# Patient Record
Sex: MALE | Race: WHITE | Employment: FULL TIME | URBAN - METROPOLITAN AREA
[De-identification: names, ages, dates, MRNs, and addresses within clinical notes are randomized per-mention and may not be internally consistent; named-entity substitution may affect disease eponyms.]

---

## 2021-11-28 ENCOUNTER — APPOINTMENT (OUTPATIENT)
Dept: GENERAL RADIOLOGY | Age: 51
DRG: 177 | End: 2021-11-28
Attending: EMERGENCY MEDICINE
Payer: COMMERCIAL

## 2021-11-28 ENCOUNTER — HOSPITAL ENCOUNTER (INPATIENT)
Age: 51
LOS: 8 days | Discharge: HOME HEALTH | DRG: 177 | End: 2021-12-06
Attending: EMERGENCY MEDICINE | Admitting: INTERNAL MEDICINE
Payer: COMMERCIAL

## 2021-11-28 DIAGNOSIS — U07.1 COVID-19: Primary | ICD-10-CM

## 2021-11-28 DIAGNOSIS — R09.02 HYPOXIA: ICD-10-CM

## 2021-11-28 PROBLEM — J12.82 PNEUMONIA DUE TO COVID-19 VIRUS: Status: ACTIVE | Noted: 2021-11-28

## 2021-11-28 PROBLEM — J96.01 ACUTE RESPIRATORY FAILURE WITH HYPOXIA (HCC): Status: ACTIVE | Noted: 2021-11-28

## 2021-11-28 LAB
ALBUMIN SERPL-MCNC: 2.4 G/DL (ref 3.5–5)
ALBUMIN/GLOB SERPL: 0.4 {RATIO} (ref 1.1–2.2)
ALP SERPL-CCNC: 66 U/L (ref 45–117)
ALT SERPL-CCNC: 70 U/L (ref 12–78)
ANION GAP SERPL CALC-SCNC: 16 MMOL/L (ref 5–15)
AST SERPL-CCNC: 55 U/L (ref 15–37)
BASOPHILS # BLD: 0 K/UL (ref 0–0.1)
BASOPHILS NFR BLD: 0 % (ref 0–1)
BILIRUB SERPL-MCNC: 0.8 MG/DL (ref 0.2–1)
BUN SERPL-MCNC: 21 MG/DL (ref 6–20)
BUN/CREAT SERPL: 18 (ref 12–20)
CALCIUM SERPL-MCNC: 8.9 MG/DL (ref 8.5–10.1)
CHLORIDE SERPL-SCNC: 92 MMOL/L (ref 97–108)
CO2 SERPL-SCNC: 20 MMOL/L (ref 21–32)
COVID-19 RAPID TEST, COVR: DETECTED
CREAT SERPL-MCNC: 1.15 MG/DL (ref 0.7–1.3)
D DIMER PPP FEU-MCNC: 1.45 MG/L FEU (ref 0–0.65)
DIFFERENTIAL METHOD BLD: ABNORMAL
EOSINOPHIL # BLD: 0 K/UL (ref 0–0.4)
EOSINOPHIL NFR BLD: 0 % (ref 0–7)
ERYTHROCYTE [DISTWIDTH] IN BLOOD BY AUTOMATED COUNT: 11.3 % (ref 11.5–14.5)
EST. AVERAGE GLUCOSE BLD GHB EST-MCNC: 289 MG/DL
FERRITIN SERPL-MCNC: 1610 NG/ML (ref 26–388)
FLUAV AG NPH QL IA: NEGATIVE
FLUBV AG NOSE QL IA: NEGATIVE
GLOBULIN SER CALC-MCNC: 6 G/DL (ref 2–4)
GLUCOSE SERPL-MCNC: 423 MG/DL (ref 65–100)
HBA1C MFR BLD: 11.7 % (ref 4–5.6)
HCT VFR BLD AUTO: 36.3 % (ref 36.6–50.3)
HGB BLD-MCNC: 12.8 G/DL (ref 12.1–17)
IMM GRANULOCYTES # BLD AUTO: 0.1 K/UL (ref 0–0.04)
IMM GRANULOCYTES NFR BLD AUTO: 1 % (ref 0–0.5)
LYMPHOCYTES # BLD: 0.4 K/UL (ref 0.8–3.5)
LYMPHOCYTES NFR BLD: 5 % (ref 12–49)
MCH RBC QN AUTO: 31.7 PG (ref 26–34)
MCHC RBC AUTO-ENTMCNC: 35.3 G/DL (ref 30–36.5)
MCV RBC AUTO: 89.9 FL (ref 80–99)
MONOCYTES # BLD: 0.2 K/UL (ref 0–1)
MONOCYTES NFR BLD: 2 % (ref 5–13)
NEUTS SEG # BLD: 7.2 K/UL (ref 1.8–8)
NEUTS SEG NFR BLD: 92 % (ref 32–75)
NRBC # BLD: 0 K/UL (ref 0–0.01)
NRBC BLD-RTO: 0 PER 100 WBC
PLATELET # BLD AUTO: 396 K/UL (ref 150–400)
PMV BLD AUTO: 10.2 FL (ref 8.9–12.9)
POTASSIUM SERPL-SCNC: 4 MMOL/L (ref 3.5–5.1)
PROCALCITONIN SERPL-MCNC: 0.46 NG/ML
PROT SERPL-MCNC: 8.4 G/DL (ref 6.4–8.2)
RBC # BLD AUTO: 4.04 M/UL (ref 4.1–5.7)
RBC MORPH BLD: ABNORMAL
SODIUM SERPL-SCNC: 128 MMOL/L (ref 136–145)
SOURCE, COVRS: ABNORMAL
TROPONIN-HIGH SENSITIVITY: 24 NG/L (ref 0–76)
WBC # BLD AUTO: 7.9 K/UL (ref 4.1–11.1)

## 2021-11-28 PROCEDURE — 74011250637 HC RX REV CODE- 250/637: Performed by: INTERNAL MEDICINE

## 2021-11-28 PROCEDURE — 71045 X-RAY EXAM CHEST 1 VIEW: CPT

## 2021-11-28 PROCEDURE — 87635 SARS-COV-2 COVID-19 AMP PRB: CPT

## 2021-11-28 PROCEDURE — 82728 ASSAY OF FERRITIN: CPT

## 2021-11-28 PROCEDURE — 80053 COMPREHEN METABOLIC PANEL: CPT

## 2021-11-28 PROCEDURE — 36415 COLL VENOUS BLD VENIPUNCTURE: CPT

## 2021-11-28 PROCEDURE — 87804 INFLUENZA ASSAY W/OPTIC: CPT

## 2021-11-28 PROCEDURE — XW0DXM6 INTRODUCTION OF BARICITINIB INTO MOUTH AND PHARYNX, EXTERNAL APPROACH, NEW TECHNOLOGY GROUP 6: ICD-10-PCS | Performed by: INTERNAL MEDICINE

## 2021-11-28 PROCEDURE — 85379 FIBRIN DEGRADATION QUANT: CPT

## 2021-11-28 PROCEDURE — 83036 HEMOGLOBIN GLYCOSYLATED A1C: CPT

## 2021-11-28 PROCEDURE — 93005 ELECTROCARDIOGRAM TRACING: CPT

## 2021-11-28 PROCEDURE — 74011250636 HC RX REV CODE- 250/636: Performed by: EMERGENCY MEDICINE

## 2021-11-28 PROCEDURE — 85025 COMPLETE CBC W/AUTO DIFF WBC: CPT

## 2021-11-28 PROCEDURE — 65660000000 HC RM CCU STEPDOWN

## 2021-11-28 PROCEDURE — 99285 EMERGENCY DEPT VISIT HI MDM: CPT

## 2021-11-28 PROCEDURE — 84484 ASSAY OF TROPONIN QUANT: CPT

## 2021-11-28 PROCEDURE — 84145 PROCALCITONIN (PCT): CPT

## 2021-11-28 RX ORDER — ACETAMINOPHEN 325 MG/1
650 TABLET ORAL
Status: DISCONTINUED | OUTPATIENT
Start: 2021-11-28 | End: 2021-12-06 | Stop reason: HOSPADM

## 2021-11-28 RX ORDER — ONDANSETRON 2 MG/ML
4 INJECTION INTRAMUSCULAR; INTRAVENOUS
Status: DISCONTINUED | OUTPATIENT
Start: 2021-11-28 | End: 2021-12-06 | Stop reason: HOSPADM

## 2021-11-28 RX ORDER — DEXAMETHASONE 6 MG/1
6 TABLET ORAL DAILY
Status: DISCONTINUED | OUTPATIENT
Start: 2021-11-29 | End: 2021-12-02

## 2021-11-28 RX ORDER — SODIUM CHLORIDE 0.9 % (FLUSH) 0.9 %
5-40 SYRINGE (ML) INJECTION EVERY 8 HOURS
Status: DISCONTINUED | OUTPATIENT
Start: 2021-11-28 | End: 2021-12-06 | Stop reason: HOSPADM

## 2021-11-28 RX ORDER — DEXAMETHASONE SODIUM PHOSPHATE 4 MG/ML
6 INJECTION, SOLUTION INTRA-ARTICULAR; INTRALESIONAL; INTRAMUSCULAR; INTRAVENOUS; SOFT TISSUE ONCE
Status: COMPLETED | OUTPATIENT
Start: 2021-11-28 | End: 2021-11-28

## 2021-11-28 RX ORDER — ACETAMINOPHEN 650 MG/1
650 SUPPOSITORY RECTAL
Status: DISCONTINUED | OUTPATIENT
Start: 2021-11-28 | End: 2021-12-06 | Stop reason: HOSPADM

## 2021-11-28 RX ORDER — POLYETHYLENE GLYCOL 3350 17 G/17G
17 POWDER, FOR SOLUTION ORAL DAILY PRN
Status: DISCONTINUED | OUTPATIENT
Start: 2021-11-28 | End: 2021-12-06 | Stop reason: HOSPADM

## 2021-11-28 RX ORDER — SODIUM CHLORIDE 0.9 % (FLUSH) 0.9 %
5-40 SYRINGE (ML) INJECTION AS NEEDED
Status: DISCONTINUED | OUTPATIENT
Start: 2021-11-28 | End: 2021-12-06 | Stop reason: HOSPADM

## 2021-11-28 RX ORDER — ENOXAPARIN SODIUM 100 MG/ML
40 INJECTION SUBCUTANEOUS DAILY
Status: DISCONTINUED | OUTPATIENT
Start: 2021-11-29 | End: 2021-12-06 | Stop reason: HOSPADM

## 2021-11-28 RX ORDER — ONDANSETRON 4 MG/1
4 TABLET, ORALLY DISINTEGRATING ORAL
Status: DISCONTINUED | OUTPATIENT
Start: 2021-11-28 | End: 2021-12-06 | Stop reason: HOSPADM

## 2021-11-28 RX ADMIN — Medication 10 ML: at 15:00

## 2021-11-28 RX ADMIN — BARICITINIB 4 MG: 2 TABLET, FILM COATED ORAL at 14:28

## 2021-11-28 RX ADMIN — DEXAMETHASONE SODIUM PHOSPHATE 6 MG: 4 INJECTION, SOLUTION INTRAMUSCULAR; INTRAVENOUS at 14:28

## 2021-11-28 NOTE — ED TRIAGE NOTES
Patient arrives with c/o of SOB, fatigue, N/V/D, cough, body aches. Sx present for past 7 days. Vaccinated with pfizer vaccine, both doses. SpO2: 88% on RA. Placed on 2 L NC in triage. SpO2: 92% on 2L.

## 2021-11-28 NOTE — H&P
[facility]  [address] [facility], [place], [address] [place] [address]  [phone]    Admission History and Physical      NAME:  Melissa Bennett   :   1970   MRN:  [id number]     PCP:  No primary care provider on file. Date/Time of service:  2021  1:43 PM        Subjective:     CHIEF COMPLAINT: worsening shortness of breath    HISTORY OF PRESENT ILLNESS:     Mr. Diana Masterson is a 49 y.o.  male who is admitted with covid 23. He developed symptoms last Monday or Tuesday which included shortness of breath, fatigue, nausea, vomiting, diarrhea. States by Tuesday night, diarrhea had been the worse but currently diarrhea is resolved. Primary symptom now is worsening shortness of breath. Has been vaccinated with pfizer vaccine, second dose end of April. States he is visiting his brother here in [place]. Of note, he has list of meds on his phone and says he will \"do his homework\" and let us know what they are but that he would like to just focus on this covid and not worry about anything else. I explain that we are focusing on covid but that we also will continue his home regimen to help him. No Known Allergies    Prior to Admission medications    Not on File   Awaiting patient to provide list from his phone    Past Medical History: CAD, DM2    No past surgical history on file.     Social History     Tobacco Use    Smoking status: Not on file    Smokeless tobacco: Not on file   Substance Use Topics    Alcohol use: Not on file      Family History: noncontributory    Review of Systems:  (bold if positive, if negative)    Gen:  ; body achesEyes:  ENT:  CVS:  Pulm:  dyspneaGI:  :  MS:  Skin:  Psych:  Endo:  Hem:  Renal:  Neuro:          Objective:      VITALS:    Vital signs reviewed; most recent are:    Visit Vitals  BP (!) 149/63   Pulse 85   Temp 98.2 °F (36.8 °C)   Resp 24   Ht 6' 3\" (1.905 m)   Wt 118.1 kg (260 lb 5.8 oz)   SpO2 96%   BMI 32.54 kg/m²     SpO2 Readings from Last 6 Encounters: 11/28/21 96%    O2 Flow Rate (L/min): 2 l/min   No intake or output data in the 24 hours ending 11/28/21 1343     Exam:     Physical Exam:    Gen:  Well-developed, well-nourished, in no acute distress  HEENT:  Pink conjunctivae, PERRL, hearing intact to voice, moist mucous membranes  Neck:  Supple, without masses, thyroid non-tender  Resp:  No accessory muscle use, clear breath sounds without wheezes rales or rhonchi  Card:  No murmurs, normal S1, S2 without thrills, bruits or peripheral edema  Abd:  Soft, non-tender, non-distended, normoactive bowel sounds are present, no palpable organomegaly and no detectable hernias  Lymph:  No cervical adenopathy  Musc:  No cyanosis or clubbing  Skin:  No rashes or ulcers, skin turgor is good  Neuro:  Nonfocal, moves extremities, follows commands  Psych:  Alert with good insight. Oriented to person, place, and time    Labs:    Recent Labs     11/28/21  1210   WBC 7.9   HGB 12.8   HCT 36.3*        Recent Labs     11/28/21  1210   *   K 4.0   CL 92*   CO2 20*   *   BUN 21*   CREA 1.15   CA 8.9   ALB 2.4*   TBILI 0.8   ALT 70          Assessment/Plan:       1. Acute hypoxic respiratory failure - supp O2  2. Covid 19 pneumonia, infection - decadron, baricitinib; trend inflammatory markers  3. DM2 with hyperglycemia - glu 423 on admission; pseudohyponatremia, check a1c; on metformin, glipizide as outpatient, reorder once doses confirmed  4. CAD s/p stents - on lipitor, brilinta, asa as outpatient, reorder once doses confirmed  5. Elevated creatinine - no baseline for comparison; poss LISA sec to dec po intake, dehydration from covid; IVF, monitor  6. Obesity - BMI 32.54  7. VTE prophylaxis - lovenox  8. Dispo - admit to remote tele    Spent 20 minutes discussing code status. He wishes to be DNR. I confirmed multiple times and patient states \"just let me go. I've lived my life. \" Order placed in [facility].     Attending Physician: Brenda Casey,

## 2021-11-28 NOTE — ED PROVIDER NOTES
Date of Service:  11/28/2021    Patient:  Enmanuel Galvan    Chief Complaint:  Concern For COVID-19 (Coronavirus) and Shortness of Breath       HPI:  Enmanuel Galvan is a 49 y.o.  male who presents for evaluation of concern for coronavirus. Patient started having symptoms Sunday or Monday a week ago. The next day he drove to town to visit family for Thanksgiving. He notes several days of fever 10 1-1 02. Multiple episodes of diarrhea one evening body ache shortness of breath nonproductive cough. Currently he says diarrhea has resolved. His only complaint now is shortness of breath, feeling very fatigued, intermittent cough and body aches. He is vaccinated against Covid. He denies any chest pain abdominal pain. No current nausea or vomiting but he does have decreased appetite. No other complaint           No past medical history on file. No past surgical history on file. No family history on file. Social History     Socioeconomic History    Marital status: Not on file     Spouse name: Not on file    Number of children: Not on file    Years of education: Not on file    Highest education level: Not on file   Occupational History    Not on file   Tobacco Use    Smoking status: Not on file    Smokeless tobacco: Not on file   Substance and Sexual Activity    Alcohol use: Not on file    Drug use: Not on file    Sexual activity: Not on file   Other Topics Concern    Not on file   Social History Narrative    Not on file     Social Determinants of Health     Financial Resource Strain:     Difficulty of Paying Living Expenses: Not on file   Food Insecurity:     Worried About Running Out of Food in the Last Year: Not on file    Inez of Food in the Last Year: Not on file   Transportation Needs:     Lack of Transportation (Medical): Not on file    Lack of Transportation (Non-Medical):  Not on file   Physical Activity:     Days of Exercise per Week: Not on file    Minutes of Exercise per Session: Not on file   Stress:     Feeling of Stress : Not on file   Social Connections:     Frequency of Communication with Friends and Family: Not on file    Frequency of Social Gatherings with Friends and Family: Not on file    Attends [employer] Services: Not on file    Active Member of Clubs or Organizations: Not on file    Attends Club or Organization Meetings: Not on file    Marital Status: Not on file   Intimate Partner Violence:     Fear of Current or Ex-Partner: Not on file    Emotionally Abused: Not on file    Physically Abused: Not on file    Sexually Abused: Not on file   Housing Stability:     Unable to Pay for Housing in the Last Year: Not on file    Number of [place] in the Last Year: Not on file    Unstable Housing in the Last Year: Not on file         ALLERGIES: Patient has no known allergies. Review of Systems   Constitutional: Positive for appetite change, fatigue and fever. HENT: Negative for hearing loss. Eyes: Negative for visual disturbance. Respiratory: Positive for cough. Cardiovascular: Negative for chest pain. Gastrointestinal: Positive for diarrhea and nausea. Negative for abdominal pain and vomiting. Genitourinary: Negative for flank pain. Musculoskeletal: Positive for myalgias. Negative for back pain. Skin: Negative for rash. Neurological: Negative for dizziness and light-headedness. Psychiatric/Behavioral: Negative for suicidal ideas. Vitals:    11/28/21 1152 11/28/21 1154 11/28/21 1219   BP: (!) 151/55     Pulse: 85     Resp: 24     Temp: 98.2 °F (36.8 °C)     SpO2: (!) 87% 92% 92%   Weight: 118.1 kg (260 lb 5.8 oz)     Height: 6' 3\" (1.905 m)              Physical Exam  Vitals and nursing note reviewed. Constitutional:       Appearance: He is well-developed. HENT:      Head: Normocephalic and atraumatic. Mouth/Throat:      Mouth: Mucous membranes are moist.   Cardiovascular:      Rate and Rhythm: Normal rate.    Pulmonary:      Effort: Pulmonary effort is normal. Tachypnea present. Breath sounds: No wheezing or rhonchi. Chest:      Chest wall: No mass or tenderness. Abdominal:      Palpations: Abdomen is soft. Musculoskeletal:      Right lower leg: No tenderness. No edema. Left lower leg: No tenderness. No edema. Skin:     General: Skin is warm. Capillary Refill: Capillary refill takes less than 2 seconds. Neurological:      Mental Status: He is alert and oriented to person, place, and time. Psychiatric:         Mood and Affect: Mood normal.          [facility]  ED Course as of 11/28/21 1338   Sun Nov 28, 2021   1139 EKG 1126  NSR 83  Normal axis. intervals  No STEMI [GG]   1309 Sodium(!): 128 [GG]   1309 Glucose(!): 423 [GG]      ED Course User Index  [GG] Art Apt, DO     VITAL SIGNS:  Patient Vitals for the past 4 hrs:   Temp Pulse Resp BP SpO2   11/28/21 1300    (!) 149/63 96 %   11/28/21 1230    (!) 146/68 99 %   11/28/21 1219     92 %   11/28/21 1154     92 %   11/28/21 1152 98.2 °F (36.8 °C) 85 24 (!) 151/55 (!) 87 %         LABS:  Recent Results (from the past 6 hour(s))   CBC WITH AUTOMATED DIFF    Collection Time: 11/28/21 12:10 PM   Result Value Ref Range    WBC 7.9 4.1 - 11.1 K/uL    RBC 4.04 (L) 4.10 - 5.70 M/uL    HGB 12.8 12.1 - 17.0 g/dL    HCT 36.3 (L) 36.6 - 50.3 %    MCV 89.9 80.0 - 99.0 FL    MCH 31.7 26.0 - 34.0 PG    MCHC 35.3 30.0 - 36.5 g/dL    RDW 11.3 (L) 11.5 - 14.5 %    PLATELET 823 846 - 107 K/uL    MPV 10.2 8.9 - 12.9 FL    NRBC 0.0 0  WBC    ABSOLUTE NRBC 0.00 0.00 - 0.01 K/uL    NEUTROPHILS 92 (H) 32 - 75 %    LYMPHOCYTES 5 (L) 12 - 49 %    MONOCYTES 2 (L) 5 - 13 %    EOSINOPHILS 0 0 - 7 %    BASOPHILS 0 0 - 1 %    IMMATURE GRANULOCYTES 1 (H) 0.0 - 0.5 %    ABS. NEUTROPHILS 7.2 1.8 - 8.0 K/UL    ABS. LYMPHOCYTES 0.4 (L) 0.8 - 3.5 K/UL    ABS. MONOCYTES 0.2 0.0 - 1.0 K/UL    ABS. EOSINOPHILS 0.0 0.0 - 0.4 K/UL    ABS. BASOPHILS 0.0 0.0 - 0.1 K/UL    ABS. IMM. GRANS. 0.1 (H) 0.00 - 0.04 K/UL    DF SMEAR SCANNED      RBC COMMENTS NORMOCYTIC, NORMOCHROMIC     METABOLIC PANEL, COMPREHENSIVE    Collection Time: 11/28/21 12:10 PM   Result Value Ref Range    Sodium 128 (L) 136 - 145 mmol/L    Potassium 4.0 3.5 - 5.1 mmol/L    Chloride 92 (L) 97 - 108 mmol/L    CO2 20 (L) 21 - 32 mmol/L    Anion gap 16 (H) 5 - 15 mmol/L    Glucose 423 (H) 65 - 100 mg/dL    BUN 21 (H) 6 - 20 MG/DL    Creatinine 1.15 0.70 - 1.30 MG/DL    BUN/Creatinine ratio 18 12 - 20      GFR est AA >60 >60 ml/min/1.73m2    GFR est non-AA >60 >60 ml/min/1.73m2    Calcium 8.9 8.5 - 10.1 MG/DL    Bilirubin, total 0.8 0.2 - 1.0 MG/DL    ALT (SGPT) 70 12 - 78 U/L    AST (SGOT) 55 (H) 15 - 37 U/L    Alk. phosphatase 66 45 - 117 U/L    Protein, total 8.4 (H) 6.4 - 8.2 g/dL    Albumin 2.4 (L) 3.5 - 5.0 g/dL    Globulin 6.0 (H) 2.0 - 4.0 g/dL    A-G Ratio 0.4 (L) 1.1 - 2.2     PROCALCITONIN    Collection Time: 11/28/21 12:10 PM   Result Value Ref Range    Procalcitonin 0.46 ng/mL   TROPONIN-HIGH SENSITIVITY    Collection Time: 11/28/21 12:10 PM   Result Value Ref Range    Troponin-High Sensitivity 24 0 - 76 ng/L   COVID-19 RAPID TEST    Collection Time: 11/28/21 12:45 PM   Result Value Ref Range    Specimen source Nasopharyngeal      COVID-19 rapid test Detected (AA) NOTD     INFLUENZA A+B VIRAL AGS    Collection Time: 11/28/21 12:45 PM   Result Value Ref Range    Influenza A Antigen Negative NEG      Influenza B Antigen Negative NEG          IMAGING:  XR CHEST PORT   Final Result   Faint airspace opacities in the right midlung may represent   early/mild atypical/viral pneumonia. Medications During Visit:  Medications   dexamethasone (DECADRON) 4 mg/mL injection 6 mg (has no administration in time range)         DECISION MAKING:  Tom Serrano is a 49 y.o. male who comes in as above. Here, patient appears more comfortable on supplemental oxygen. He is hypoxic without it.   Patient will be admitted for Covid pneumonia. Decadron provided. Patient is requiring supplemental oxygen. Total critical care time spent exclusive of procedures:  35 minutes      IMPRESSION:  1. COVID-19    2. Hypoxia        DISPOSITION:  Admitted          Procedures      Perfect Serve Consult for Admission  1:38 PM    ED Room Number: ER17/17  Patient Name and age:  Cyril Turner 49 y.o.  male  Working Diagnosis:   1. COVID-19    2. Hypoxia        COVID-19 Suspicion:  yes  Sepsis present:  no  Reassessment needed: no  Code Status:  Full Code  Readmission: no  Isolation Requirements:  no  Recommended Level of Care:  telemetry  Department:[facility] ED - [phone]  Other:  COVID (vaccinated) with hypoxia, resolved with NC o2. Feeling better.

## 2021-11-29 LAB
ALBUMIN SERPL-MCNC: 2.2 G/DL (ref 3.5–5)
ALBUMIN/GLOB SERPL: 0.4 {RATIO} (ref 1.1–2.2)
ALP SERPL-CCNC: 61 U/L (ref 45–117)
ALT SERPL-CCNC: 64 U/L (ref 12–78)
ANION GAP SERPL CALC-SCNC: 16 MMOL/L (ref 5–15)
AST SERPL-CCNC: 36 U/L (ref 15–37)
ATRIAL RATE: 83 BPM
BASOPHILS # BLD: 0 K/UL (ref 0–0.1)
BASOPHILS NFR BLD: 0 % (ref 0–1)
BILIRUB SERPL-MCNC: 0.5 MG/DL (ref 0.2–1)
BUN SERPL-MCNC: 34 MG/DL (ref 6–20)
BUN/CREAT SERPL: 29 (ref 12–20)
CALCIUM SERPL-MCNC: 9.1 MG/DL (ref 8.5–10.1)
CALCULATED P AXIS, ECG09: 46 DEGREES
CALCULATED R AXIS, ECG10: 27 DEGREES
CALCULATED T AXIS, ECG11: -19 DEGREES
CHLORIDE SERPL-SCNC: 96 MMOL/L (ref 97–108)
CO2 SERPL-SCNC: 19 MMOL/L (ref 21–32)
COMMENT, HOLDF: NORMAL
CREAT SERPL-MCNC: 1.18 MG/DL (ref 0.7–1.3)
CRP SERPL-MCNC: 20.5 MG/DL (ref 0–0.6)
DIAGNOSIS, 93000: NORMAL
DIFFERENTIAL METHOD BLD: ABNORMAL
EOSINOPHIL # BLD: 0 K/UL (ref 0–0.4)
EOSINOPHIL NFR BLD: 0 % (ref 0–7)
ERYTHROCYTE [DISTWIDTH] IN BLOOD BY AUTOMATED COUNT: 11.5 % (ref 11.5–14.5)
GLOBULIN SER CALC-MCNC: 6.1 G/DL (ref 2–4)
GLUCOSE BLD STRIP.AUTO-MCNC: 456 MG/DL (ref 65–117)
GLUCOSE BLD STRIP.AUTO-MCNC: 465 MG/DL (ref 65–117)
GLUCOSE BLD STRIP.AUTO-MCNC: 497 MG/DL (ref 65–117)
GLUCOSE BLD STRIP.AUTO-MCNC: 527 MG/DL (ref 65–117)
GLUCOSE SERPL-MCNC: 471 MG/DL (ref 65–100)
HCT VFR BLD AUTO: 36.4 % (ref 36.6–50.3)
HGB BLD-MCNC: 12.6 G/DL (ref 12.1–17)
IMM GRANULOCYTES # BLD AUTO: 0 K/UL
IMM GRANULOCYTES NFR BLD AUTO: 0 %
LYMPHOCYTES # BLD: 0.4 K/UL (ref 0.8–3.5)
LYMPHOCYTES NFR BLD: 8 % (ref 12–49)
MCH RBC QN AUTO: 31.3 PG (ref 26–34)
MCHC RBC AUTO-ENTMCNC: 34.6 G/DL (ref 30–36.5)
MCV RBC AUTO: 90.3 FL (ref 80–99)
MONOCYTES # BLD: 0.2 K/UL (ref 0–1)
MONOCYTES NFR BLD: 4 % (ref 5–13)
NEUTS SEG # BLD: 3.9 K/UL (ref 1.8–8)
NEUTS SEG NFR BLD: 88 % (ref 32–75)
NRBC # BLD: 0 K/UL (ref 0–0.01)
NRBC BLD-RTO: 0 PER 100 WBC
P-R INTERVAL, ECG05: 144 MS
PLATELET # BLD AUTO: 454 K/UL (ref 150–400)
PMV BLD AUTO: 10.3 FL (ref 8.9–12.9)
POTASSIUM SERPL-SCNC: 4.2 MMOL/L (ref 3.5–5.1)
PROT SERPL-MCNC: 8.3 G/DL (ref 6.4–8.2)
Q-T INTERVAL, ECG07: 384 MS
QRS DURATION, ECG06: 98 MS
QTC CALCULATION (BEZET), ECG08: 451 MS
RBC # BLD AUTO: 4.03 M/UL (ref 4.1–5.7)
RBC MORPH BLD: ABNORMAL
SAMPLES BEING HELD,HOLD: NORMAL
SERVICE CMNT-IMP: ABNORMAL
SODIUM SERPL-SCNC: 131 MMOL/L (ref 136–145)
VENTRICULAR RATE, ECG03: 83 BPM
WBC # BLD AUTO: 4.5 K/UL (ref 4.1–11.1)

## 2021-11-29 PROCEDURE — 74011250636 HC RX REV CODE- 250/636: Performed by: INTERNAL MEDICINE

## 2021-11-29 PROCEDURE — 65660000000 HC RM CCU STEPDOWN

## 2021-11-29 PROCEDURE — 86140 C-REACTIVE PROTEIN: CPT

## 2021-11-29 PROCEDURE — 74011636637 HC RX REV CODE- 636/637: Performed by: INTERNAL MEDICINE

## 2021-11-29 PROCEDURE — 74011250637 HC RX REV CODE- 250/637: Performed by: INTERNAL MEDICINE

## 2021-11-29 PROCEDURE — 85025 COMPLETE CBC W/AUTO DIFF WBC: CPT

## 2021-11-29 PROCEDURE — 80053 COMPREHEN METABOLIC PANEL: CPT

## 2021-11-29 PROCEDURE — 82962 GLUCOSE BLOOD TEST: CPT

## 2021-11-29 PROCEDURE — 36415 COLL VENOUS BLD VENIPUNCTURE: CPT

## 2021-11-29 RX ORDER — DEXTROSE 50 % IN WATER (D50W) INTRAVENOUS SYRINGE
12.5-25 AS NEEDED
Status: DISCONTINUED | OUTPATIENT
Start: 2021-11-29 | End: 2021-12-06 | Stop reason: HOSPADM

## 2021-11-29 RX ORDER — METOPROLOL TARTRATE 50 MG/1
50 TABLET ORAL 2 TIMES DAILY
COMMUNITY

## 2021-11-29 RX ORDER — MAGNESIUM SULFATE 100 %
4 CRYSTALS MISCELLANEOUS AS NEEDED
Status: DISCONTINUED | OUTPATIENT
Start: 2021-11-29 | End: 2021-12-06 | Stop reason: HOSPADM

## 2021-11-29 RX ORDER — ASPIRIN 81 MG/1
81 TABLET ORAL DAILY
Status: DISCONTINUED | OUTPATIENT
Start: 2021-11-29 | End: 2021-12-06 | Stop reason: HOSPADM

## 2021-11-29 RX ORDER — METOPROLOL TARTRATE 50 MG/1
50 TABLET ORAL 2 TIMES DAILY
Status: DISCONTINUED | OUTPATIENT
Start: 2021-11-29 | End: 2021-12-06 | Stop reason: HOSPADM

## 2021-11-29 RX ORDER — ASPIRIN 81 MG/1
81 TABLET ORAL DAILY
COMMUNITY

## 2021-11-29 RX ORDER — GLIPIZIDE 5 MG/1
5 TABLET ORAL 2 TIMES DAILY
Status: DISCONTINUED | OUTPATIENT
Start: 2021-11-29 | End: 2021-12-06 | Stop reason: HOSPADM

## 2021-11-29 RX ORDER — LOSARTAN POTASSIUM 25 MG/1
25 TABLET ORAL DAILY
COMMUNITY

## 2021-11-29 RX ORDER — LOSARTAN POTASSIUM 25 MG/1
25 TABLET ORAL DAILY
Status: DISCONTINUED | OUTPATIENT
Start: 2021-11-29 | End: 2021-12-01

## 2021-11-29 RX ORDER — ATORVASTATIN CALCIUM 80 MG/1
80 TABLET, FILM COATED ORAL DAILY
COMMUNITY

## 2021-11-29 RX ORDER — SODIUM CHLORIDE 9 MG/ML
125 INJECTION, SOLUTION INTRAVENOUS CONTINUOUS
Status: DISCONTINUED | OUTPATIENT
Start: 2021-11-29 | End: 2021-12-02

## 2021-11-29 RX ORDER — METFORMIN HYDROCHLORIDE 500 MG/1
1000 TABLET, FILM COATED, EXTENDED RELEASE ORAL 2 TIMES DAILY WITH MEALS
COMMUNITY

## 2021-11-29 RX ORDER — SPIRONOLACTONE 25 MG/1
25 TABLET ORAL DAILY
COMMUNITY
End: 2021-12-06

## 2021-11-29 RX ORDER — INSULIN LISPRO 100 [IU]/ML
10 INJECTION, SOLUTION INTRAVENOUS; SUBCUTANEOUS
Status: DISCONTINUED | OUTPATIENT
Start: 2021-11-29 | End: 2021-11-30

## 2021-11-29 RX ORDER — GLIPIZIDE 5 MG/1
5 TABLET ORAL 2 TIMES DAILY
COMMUNITY

## 2021-11-29 RX ORDER — INSULIN LISPRO 100 [IU]/ML
INJECTION, SOLUTION INTRAVENOUS; SUBCUTANEOUS
Status: DISCONTINUED | OUTPATIENT
Start: 2021-11-29 | End: 2021-12-06 | Stop reason: HOSPADM

## 2021-11-29 RX ORDER — SPIRONOLACTONE 25 MG/1
25 TABLET ORAL DAILY
Status: DISCONTINUED | OUTPATIENT
Start: 2021-11-29 | End: 2021-12-06 | Stop reason: HOSPADM

## 2021-11-29 RX ORDER — ATORVASTATIN CALCIUM 20 MG/1
80 TABLET, FILM COATED ORAL DAILY
Status: DISCONTINUED | OUTPATIENT
Start: 2021-11-29 | End: 2021-12-06 | Stop reason: HOSPADM

## 2021-11-29 RX ADMIN — INSULIN LISPRO 10 UNITS: 100 INJECTION, SOLUTION INTRAVENOUS; SUBCUTANEOUS at 17:10

## 2021-11-29 RX ADMIN — Medication 10 ML: at 17:11

## 2021-11-29 RX ADMIN — METOPROLOL TARTRATE 50 MG: 50 TABLET, FILM COATED ORAL at 11:58

## 2021-11-29 RX ADMIN — BARICITINIB 4 MG: 2 TABLET, FILM COATED ORAL at 09:43

## 2021-11-29 RX ADMIN — METOPROLOL TARTRATE 50 MG: 50 TABLET, FILM COATED ORAL at 17:12

## 2021-11-29 RX ADMIN — DEXAMETHASONE 6 MG: 6 TABLET ORAL at 09:43

## 2021-11-29 RX ADMIN — SPIRONOLACTONE 25 MG: 25 TABLET ORAL at 11:58

## 2021-11-29 RX ADMIN — Medication 10 ML: at 21:39

## 2021-11-29 RX ADMIN — LOSARTAN POTASSIUM 25 MG: 25 TABLET, FILM COATED ORAL at 11:58

## 2021-11-29 RX ADMIN — GLIPIZIDE 5 MG: 5 TABLET ORAL at 11:59

## 2021-11-29 RX ADMIN — SODIUM CHLORIDE 125 ML/HR: 9 INJECTION, SOLUTION INTRAVENOUS at 17:11

## 2021-11-29 RX ADMIN — INSULIN LISPRO 4 UNITS: 100 INJECTION, SOLUTION INTRAVENOUS; SUBCUTANEOUS at 21:39

## 2021-11-29 RX ADMIN — GLIPIZIDE 5 MG: 5 TABLET ORAL at 17:12

## 2021-11-29 RX ADMIN — INSULIN LISPRO 7 UNITS: 100 INJECTION, SOLUTION INTRAVENOUS; SUBCUTANEOUS at 17:09

## 2021-11-29 RX ADMIN — ATORVASTATIN CALCIUM 80 MG: 20 TABLET, FILM COATED ORAL at 11:58

## 2021-11-29 RX ADMIN — INSULIN LISPRO 20 UNITS: 100 INJECTION, SOLUTION INTRAVENOUS; SUBCUTANEOUS at 11:56

## 2021-11-29 RX ADMIN — ASPIRIN 81 MG: 81 TABLET, COATED ORAL at 11:58

## 2021-11-29 RX ADMIN — SODIUM CHLORIDE 125 ML/HR: 9 INJECTION, SOLUTION INTRAVENOUS at 09:49

## 2021-11-29 RX ADMIN — TICAGRELOR 60 MG: 60 TABLET ORAL at 17:16

## 2021-11-29 RX ADMIN — INSULIN HUMAN 20 UNITS: 100 INJECTION, SUSPENSION SUBCUTANEOUS at 17:10

## 2021-11-29 RX ADMIN — INSULIN LISPRO 10 UNITS: 100 INJECTION, SOLUTION INTRAVENOUS; SUBCUTANEOUS at 11:57

## 2021-11-29 NOTE — PROGRESS NOTES
TRANSFER - IN REPORT:    Verbal report received from ED Nurse(name) on Junior Burgess  being received from ED(unit) for routine progression of care      Report consisted of patients Situation, Background, Assessment and   Recommendations(SBAR). Information from the following report(s) SBAR, Kardex and MAR was reviewed with the receiving nurse. Opportunity for questions and clarification was provided. Assessment completed upon patients arrival to unit and care assumed.

## 2021-11-29 NOTE — ED NOTES
TRANSFER - OUT REPORT:    Verbal report given to 5th floor nurse (name) on Zhen Arzola  being transferred to 5th floor(unit) for routine progression of care       Report consisted of patients Situation, Background, Assessment and   Recommendations(SBAR). Information from the following report(s) SBAR, Kardex, ED Summary, Procedure Summary and Intake/Output was reviewed with the receiving nurse. Lines:   Peripheral IV 11/28/21 Left Antecubital (Active)   Site Assessment Clean, dry, & intact 11/28/21 1221        Opportunity for questions and clarification was provided.       Patient transported with:   Registered Nurse

## 2021-11-29 NOTE — PROGRESS NOTES
[facility]: MED RECONCILIATION    Comments/Recommendations:     Reviewed home medications with patient via phone based on refill history from [facility]. Patient does not have a preferred pharmacy locally as he is from out of town. Denies medication allergies  Reports that he has not taken his metformin in several days d/t loss of appetite    Information obtained from: patient, Rx query    Significant PMH/Disease States: No past medical history on file. Chief Complaint for this Admission:   Chief Complaint   Patient presents with    Concern For COVID-19 (Coronavirus)    Shortness of Breath     Allergies: Patient has no known allergies. Prior to Admission Medications:     Medication Documentation Review Audit       Reviewed by Emily Sinha PHARMD (Pharmacist) on 11/29/21 at 0925      Medication Sig Documenting Provider Last Dose Status Taking?   aspirin delayed-release 81 mg tablet Take 81 mg by mouth daily. Provider, Historical  Active Yes   atorvastatin (LIPITOR) 80 mg tablet Take 80 mg by mouth daily. Provider, Historical  Active Yes   glipiZIDE (GLUCOTROL) 5 mg tablet Take 5 mg by mouth two (2) times a day. Provider, Historical  Active Yes   losartan (COZAAR) 25 mg tablet Take 25 mg by mouth daily. Provider, Historical  Active Yes   metFORMIN (GLUMETZA ER) 500 mg TG24 24 hour tablet Take 1,000 mg by mouth two (2) times daily (with meals). Provider, Historical  Active Yes           Med Note Rui Anne Nov 29, 2021  9:24 AM) Has not taken in a while d/t loss of appetite   metoprolol tartrate (LOPRESSOR) 50 mg tablet Take 50 mg by mouth two (2) times a day. Provider, Historical  Active Yes   spironolactone (ALDACTONE) 25 mg tablet Take 25 mg by mouth daily. Provider, Historical  Active Yes   ticagrelor (Brilinta) 60 mg tab tablet Take 60 mg by mouth two (2) times a day. Provider, Historical  Active Yes                Thank you,  Amari CHAHAL [facility]

## 2021-11-29 NOTE — PROGRESS NOTES
[facility]  [address] [facility], [place], [address] [place] [address]  [phone]      Medical Progress Note      NAME: Andre Arroyo   :  1970  MRM:  [id number]    Date of service: 2021  7:05 AM       Assessment and Plan:   1. Acute hypoxic respiratory failure due to COVID 19. Continue supplement O2 to keep SAO2 > 90%     2. Covid 19 pneumonia. Continue decadron, baricitinib; trend inflammatory markers    3. DM2 with hyperglycemia - poorly controlled. A1C: 11.7. SSI and may need to start NPH as pt is on steroid. 4.  Hyponatremia. continue IVF and monitor     5. CAD s/p stents - on lipitor, brilinta, asa as outpatient, reorder once doses confirmed    6. Obesity - BMI 32.54. would benefit from weight loss    Pt is from [place], here visiting. Subjective:     Chief Complaint[de-identified] Patient was seen and examined as a follow up for COVID. Chart was reviewed. c/o cough and SOB    ROS:  (bold if positive, if negative)    Tolerating PT  Tolerating Diet        Objective:     Last 24hrs VS reviewed since prior progress note.  Most recent are:    Visit Vitals  /60   Pulse 68   Temp 98.2 °F (36.8 °C)   Resp (!) 35   Ht 6' 3\" (1.905 m)   Wt 118.1 kg (260 lb 5.8 oz)   SpO2 92%   BMI 32.54 kg/m²     SpO2 Readings from Last 6 Encounters:   21 92%    O2 Flow Rate (L/min): 4 l/min   No intake or output data in the 24 hours ending 21 0705     Physical Exam:    Gen:  Well-developed, well-nourished, in no acute distress  HEENT:  Pink conjunctivae, PERRL, hearing intact to voice, moist mucous membranes  Neck:  Supple, without masses, thyroid non-tender  Resp:  No accessory muscle use, clear breath sounds without wheezes rales or rhonchi  Card:  No murmurs, normal S1, S2 without thrills, bruits or peripheral edema  Abd:  Soft, non-tender, non-distended, normoactive bowel sounds are present, no palpable organomegaly and no detectable hernias  Lymph:  No cervical or inguinal adenopathy  Musc:  No cyanosis or clubbing  Skin:  No rashes or ulcers, skin turgor is good  Neuro:  Cranial nerves are grossly intact, no focal motor weakness, follows commands appropriately  Psych:  Good insight, oriented to person, place and time, alert  __________________________________________________________________  Medications Reviewed: (see below)  Medications:     Current Facility-Administered Medications   Medication Dose Route Frequency    baricitinib (OLUMIANT) tablet 4 mg  4 mg Oral DAILY    sodium chloride (NS) flush 5-40 mL  5-40 mL IntraVENous Q8H    sodium chloride (NS) flush 5-40 mL  5-40 mL IntraVENous PRN    acetaminophen (TYLENOL) tablet 650 mg  650 mg Oral Q6H PRN    Or    acetaminophen (TYLENOL) suppository 650 mg  650 mg Rectal Q6H PRN    polyethylene glycol (MIRALAX) packet 17 g  17 g Oral DAILY PRN    ondansetron (ZOFRAN ODT) tablet 4 mg  4 mg Oral Q8H PRN    Or    ondansetron (ZOFRAN) injection 4 mg  4 mg IntraVENous Q6H PRN    enoxaparin (LOVENOX) injection 40 mg  40 mg SubCUTAneous DAILY    dexAMETHasone (DECADRON) tablet 6 mg  6 mg Oral DAILY     No current outpatient medications on file. Lab Data Reviewed: (see below)  Lab Review:     Recent Labs     11/28/21  1210   WBC 7.9   HGB 12.8   HCT 36.3*        Recent Labs     11/28/21  1210   *   K 4.0   CL 92*   CO2 20*   *   BUN 21*   CREA 1.15   CA 8.9   ALB 2.4*   TBILI 0.8   ALT 70     No results found for: GLUCPOC  No results for input(s): PH, PCO2, PO2, HCO3, FIO2 in the last 72 hours. No results for input(s): INR, INREXT in the last 72 hours.   All Micro Results     Procedure Component Value Units Date/Time    COVID-19 RAPID TEST [[id number]]  (Abnormal) Collected: 11/28/21 1245    Order Status: Completed Specimen: Nasopharyngeal Updated: 11/28/21 1323     Specimen source Nasopharyngeal        COVID-19 rapid test Detected        Comment: CALLED TO AND READ BACK BY  SUKHWINDER HALL,[id number],DK  Rapid Abbott ID Now       The specimen is POSITIVE for SARS-CoV-2, the novel coronavirus associated with COVID-19. This test has been authorized by the FDA under an Emergency Use Authorization (EUA) for use by authorized laboratories. Fact sheet for Healthcare Providers: ConventionUpdate.co.nz  Fact sheet for Patients: ConventionUpdate.co.nz       Methodology: Isothermal Nucleic Acid Amplification               I have reviewed notes of prior 24hr. Other pertinent lab: Total time spent with patient: 28 I personally reviewed chart, notes, data and current medications in the medical record. I have personally examined and treated the patient at bedside during this period.                  Care Plan discussed with: Patient, Nursing Staff and >50% of time spent in counseling and coordination of care    Discussed:  Care Plan    Prophylaxis:  Lovenox    Disposition:  Home w/Family           ___________________________________________________    Attending Physician: Gloria Anderson MD

## 2021-11-30 LAB
ALBUMIN SERPL-MCNC: 1.9 G/DL (ref 3.5–5)
ALBUMIN/GLOB SERPL: 0.4 {RATIO} (ref 1.1–2.2)
ALP SERPL-CCNC: 55 U/L (ref 45–117)
ALT SERPL-CCNC: 74 U/L (ref 12–78)
ANION GAP SERPL CALC-SCNC: 8 MMOL/L (ref 5–15)
AST SERPL-CCNC: 69 U/L (ref 15–37)
BASOPHILS # BLD: 0 K/UL (ref 0–0.1)
BASOPHILS NFR BLD: 0 % (ref 0–1)
BILIRUB SERPL-MCNC: 0.5 MG/DL (ref 0.2–1)
BUN SERPL-MCNC: 35 MG/DL (ref 6–20)
BUN/CREAT SERPL: 40 (ref 12–20)
CALCIUM SERPL-MCNC: 8.5 MG/DL (ref 8.5–10.1)
CHLORIDE SERPL-SCNC: 103 MMOL/L (ref 97–108)
CO2 SERPL-SCNC: 21 MMOL/L (ref 21–32)
CREAT SERPL-MCNC: 0.88 MG/DL (ref 0.7–1.3)
CRP SERPL-MCNC: 9.65 MG/DL (ref 0–0.6)
D DIMER PPP FEU-MCNC: 2.12 MG/L FEU (ref 0–0.65)
DIFFERENTIAL METHOD BLD: ABNORMAL
EOSINOPHIL # BLD: 0 K/UL (ref 0–0.4)
EOSINOPHIL NFR BLD: 0 % (ref 0–7)
ERYTHROCYTE [DISTWIDTH] IN BLOOD BY AUTOMATED COUNT: 11.8 % (ref 11.5–14.5)
FERRITIN SERPL-MCNC: 1613 NG/ML (ref 26–388)
GLOBULIN SER CALC-MCNC: 5.1 G/DL (ref 2–4)
GLUCOSE BLD STRIP.AUTO-MCNC: 356 MG/DL (ref 65–117)
GLUCOSE BLD STRIP.AUTO-MCNC: 383 MG/DL (ref 65–117)
GLUCOSE BLD STRIP.AUTO-MCNC: 422 MG/DL (ref 65–117)
GLUCOSE BLD STRIP.AUTO-MCNC: 476 MG/DL (ref 65–117)
GLUCOSE SERPL-MCNC: 387 MG/DL (ref 65–100)
HCT VFR BLD AUTO: 31.5 % (ref 36.6–50.3)
HGB BLD-MCNC: 10.9 G/DL (ref 12.1–17)
IMM GRANULOCYTES # BLD AUTO: 0 K/UL
IMM GRANULOCYTES NFR BLD AUTO: 0 %
LYMPHOCYTES # BLD: 0.6 K/UL (ref 0.8–3.5)
LYMPHOCYTES NFR BLD: 8 % (ref 12–49)
MCH RBC QN AUTO: 30.9 PG (ref 26–34)
MCHC RBC AUTO-ENTMCNC: 34.6 G/DL (ref 30–36.5)
MCV RBC AUTO: 89.2 FL (ref 80–99)
MONOCYTES # BLD: 0.4 K/UL (ref 0–1)
MONOCYTES NFR BLD: 5 % (ref 5–13)
NEUTS BAND NFR BLD MANUAL: 1 % (ref 0–6)
NEUTS SEG # BLD: 6.9 K/UL (ref 1.8–8)
NEUTS SEG NFR BLD: 86 % (ref 32–75)
NRBC # BLD: 0 K/UL (ref 0–0.01)
NRBC BLD-RTO: 0 PER 100 WBC
PLATELET # BLD AUTO: 439 K/UL (ref 150–400)
PMV BLD AUTO: 10.3 FL (ref 8.9–12.9)
POTASSIUM SERPL-SCNC: 4.9 MMOL/L (ref 3.5–5.1)
PROT SERPL-MCNC: 7 G/DL (ref 6.4–8.2)
RBC # BLD AUTO: 3.53 M/UL (ref 4.1–5.7)
RBC MORPH BLD: ABNORMAL
SERVICE CMNT-IMP: ABNORMAL
SODIUM SERPL-SCNC: 132 MMOL/L (ref 136–145)
WBC # BLD AUTO: 7.9 K/UL (ref 4.1–11.1)

## 2021-11-30 PROCEDURE — 82962 GLUCOSE BLOOD TEST: CPT

## 2021-11-30 PROCEDURE — 86140 C-REACTIVE PROTEIN: CPT

## 2021-11-30 PROCEDURE — 74011250637 HC RX REV CODE- 250/637: Performed by: INTERNAL MEDICINE

## 2021-11-30 PROCEDURE — 36415 COLL VENOUS BLD VENIPUNCTURE: CPT

## 2021-11-30 PROCEDURE — 65660000000 HC RM CCU STEPDOWN

## 2021-11-30 PROCEDURE — 85379 FIBRIN DEGRADATION QUANT: CPT

## 2021-11-30 PROCEDURE — 74011250636 HC RX REV CODE- 250/636: Performed by: INTERNAL MEDICINE

## 2021-11-30 PROCEDURE — 80053 COMPREHEN METABOLIC PANEL: CPT

## 2021-11-30 PROCEDURE — 85025 COMPLETE CBC W/AUTO DIFF WBC: CPT

## 2021-11-30 PROCEDURE — 74011636637 HC RX REV CODE- 636/637: Performed by: INTERNAL MEDICINE

## 2021-11-30 PROCEDURE — 82728 ASSAY OF FERRITIN: CPT

## 2021-11-30 RX ORDER — INSULIN LISPRO 100 [IU]/ML
14 INJECTION, SOLUTION INTRAVENOUS; SUBCUTANEOUS
Status: DISCONTINUED | OUTPATIENT
Start: 2021-11-30 | End: 2021-12-02

## 2021-11-30 RX ADMIN — LOSARTAN POTASSIUM 25 MG: 25 TABLET, FILM COATED ORAL at 09:37

## 2021-11-30 RX ADMIN — GLIPIZIDE 5 MG: 5 TABLET ORAL at 17:15

## 2021-11-30 RX ADMIN — INSULIN LISPRO 8 UNITS: 100 INJECTION, SOLUTION INTRAVENOUS; SUBCUTANEOUS at 12:29

## 2021-11-30 RX ADMIN — TICAGRELOR 60 MG: 60 TABLET ORAL at 17:14

## 2021-11-30 RX ADMIN — BARICITINIB 4 MG: 2 TABLET, FILM COATED ORAL at 09:37

## 2021-11-30 RX ADMIN — SODIUM CHLORIDE 125 ML/HR: 9 INJECTION, SOLUTION INTRAVENOUS at 01:55

## 2021-11-30 RX ADMIN — INSULIN LISPRO 14 UNITS: 100 INJECTION, SOLUTION INTRAVENOUS; SUBCUTANEOUS at 12:30

## 2021-11-30 RX ADMIN — METOPROLOL TARTRATE 50 MG: 50 TABLET, FILM COATED ORAL at 17:15

## 2021-11-30 RX ADMIN — Medication 10 ML: at 21:26

## 2021-11-30 RX ADMIN — SODIUM CHLORIDE 125 ML/HR: 9 INJECTION, SOLUTION INTRAVENOUS at 17:15

## 2021-11-30 RX ADMIN — INSULIN LISPRO 7 UNITS: 100 INJECTION, SOLUTION INTRAVENOUS; SUBCUTANEOUS at 21:25

## 2021-11-30 RX ADMIN — ASPIRIN 81 MG: 81 TABLET, COATED ORAL at 09:37

## 2021-11-30 RX ADMIN — ENOXAPARIN SODIUM 40 MG: 100 INJECTION SUBCUTANEOUS at 09:36

## 2021-11-30 RX ADMIN — INSULIN HUMAN 30 UNITS: 100 INJECTION, SUSPENSION SUBCUTANEOUS at 17:15

## 2021-11-30 RX ADMIN — METOPROLOL TARTRATE 50 MG: 50 TABLET, FILM COATED ORAL at 09:37

## 2021-11-30 RX ADMIN — Medication 10 ML: at 06:00

## 2021-11-30 RX ADMIN — Medication 10 ML: at 17:16

## 2021-11-30 RX ADMIN — INSULIN LISPRO 8 UNITS: 100 INJECTION, SOLUTION INTRAVENOUS; SUBCUTANEOUS at 09:35

## 2021-11-30 RX ADMIN — GLIPIZIDE 5 MG: 5 TABLET ORAL at 09:37

## 2021-11-30 RX ADMIN — DEXAMETHASONE 6 MG: 6 TABLET ORAL at 09:37

## 2021-11-30 RX ADMIN — INSULIN LISPRO 14 UNITS: 100 INJECTION, SOLUTION INTRAVENOUS; SUBCUTANEOUS at 17:16

## 2021-11-30 RX ADMIN — ATORVASTATIN CALCIUM 80 MG: 20 TABLET, FILM COATED ORAL at 09:37

## 2021-11-30 RX ADMIN — TICAGRELOR 60 MG: 60 TABLET ORAL at 09:42

## 2021-11-30 RX ADMIN — SPIRONOLACTONE 25 MG: 25 TABLET ORAL at 09:37

## 2021-11-30 RX ADMIN — INSULIN LISPRO 10 UNITS: 100 INJECTION, SOLUTION INTRAVENOUS; SUBCUTANEOUS at 09:38

## 2021-11-30 RX ADMIN — INSULIN LISPRO 7 UNITS: 100 INJECTION, SOLUTION INTRAVENOUS; SUBCUTANEOUS at 17:15

## 2021-11-30 RX ADMIN — INSULIN HUMAN 20 UNITS: 100 INJECTION, SUSPENSION SUBCUTANEOUS at 09:36

## 2021-11-30 RX ADMIN — SODIUM CHLORIDE 125 ML/HR: 9 INJECTION, SOLUTION INTRAVENOUS at 09:39

## 2021-11-30 NOTE — PROGRESS NOTES
Eleazar Jeromy [facility] 79  [address] [facility], [place], [address] [place] [address]  [phone]      Medical Progress Note      NAME: Lianne Landis   :  1970  MRM:  [id number]    Date of service: 2021  7:05 AM       Assessment and Plan:   1. Acute hypoxic respiratory failure due to COVID 19. Continue supplement O2 to keep SAO2 > 90%     2. Covid 19 pneumonia. Continue decadron, baricitinib; trend inflammatory markers    3. DM2 with hyperglycemia - poorly controlled. A1C: 11.7. SSI. Worsened BG due to steroid. Started on NPH and lispro with meals. Continue SSI      4. Hyponatremia. Improving. continue IVF and monitor     5. CAD s/p stents - on lipitor, brilinta, asa     6. Obesity - BMI 32.54. would benefit from weight loss    Pt is from [place], here visiting. Subjective:     Chief Complaint[de-identified] Patient was seen and examined as a follow up for COVID. Chart was reviewed. c/o cough and SOB    ROS:  (bold if positive, if negative)    Tolerating PT  Tolerating Diet        Objective:     Last 24hrs VS reviewed since prior progress note.  Most recent are:    Visit Vitals  BP (!) 154/78 (BP 1 Location: Right upper arm, BP Patient Position: At rest;Sitting)   Pulse 66   Temp 97.7 °F (36.5 °C)   Resp 22   Ht 6' 3\" (1.905 m)   Wt 118.1 kg (260 lb 5.8 oz)   SpO2 91%   BMI 32.54 kg/m²     SpO2 Readings from Last 6 Encounters:   21 91%    O2 Flow Rate (L/min): 4 l/min       Intake/Output Summary (Last 24 hours) at 2021 1111  Last data filed at 2021 1816  Gross per 24 hour   Intake 400 ml   Output    Net 400 ml        Physical Exam:    Gen:  Well-developed, well-nourished, in no acute distress  HEENT:  Pink conjunctivae, PERRL, hearing intact to voice, moist mucous membranes  Neck:  Supple, without masses, thyroid non-tender  Resp:  No accessory muscle use, clear breath sounds without wheezes rales or rhonchi  Card:  No murmurs, normal S1, S2 without thrills, bruits or peripheral edema  Abd:  Soft, non-tender, non-distended, normoactive bowel sounds are present, no palpable organomegaly and no detectable hernias  Lymph:  No cervical or inguinal adenopathy  Musc:  No cyanosis or clubbing  Skin:  No rashes or ulcers, skin turgor is good  Neuro:  Cranial nerves are grossly intact, no focal motor weakness, follows commands appropriately  Psych:  Good insight, oriented to person, place and time, alert  __________________________________________________________________  Medications Reviewed: (see below)  Medications:     Current Facility-Administered Medications   Medication Dose Route Frequency    insulin lispro (HUMALOG) injection   SubCUTAneous AC&HS    glucose chewable tablet 16 g  4 Tablet Oral PRN    dextrose (D50W) injection syrg 12.5-25 g  12.5-25 g IntraVENous PRN    glucagon (GLUCAGEN) injection 1 mg  1 mg IntraMUSCular PRN    0.9% sodium chloride infusion  125 mL/hr IntraVENous CONTINUOUS    aspirin delayed-release tablet 81 mg  81 mg Oral DAILY    atorvastatin (LIPITOR) tablet 80 mg  80 mg Oral DAILY    glipiZIDE (GLUCOTROL) tablet 5 mg  5 mg Oral BID    losartan (COZAAR) tablet 25 mg  25 mg Oral DAILY    metoprolol tartrate (LOPRESSOR) tablet 50 mg  50 mg Oral BID    spironolactone (ALDACTONE) tablet 25 mg  25 mg Oral DAILY    ticagrelor (BRILINTA) tablet 60 mg  60 mg Oral BID    insulin NPH (NOVOLIN N, HUMULIN N) injection 20 Units  20 Units SubCUTAneous ACB&D    insulin lispro (HUMALOG) injection 10 Units  10 Units SubCUTAneous TIDAC    baricitinib (OLUMIANT) tablet 4 mg  4 mg Oral DAILY    sodium chloride (NS) flush 5-40 mL  5-40 mL IntraVENous Q8H    sodium chloride (NS) flush 5-40 mL  5-40 mL IntraVENous PRN    acetaminophen (TYLENOL) tablet 650 mg  650 mg Oral Q6H PRN    Or    acetaminophen (TYLENOL) suppository 650 mg  650 mg Rectal Q6H PRN    polyethylene glycol (MIRALAX) packet 17 g  17 g Oral DAILY PRN    ondansetron (ZOFRAN ODT) tablet 4 mg  4 mg Oral Q8H PRN    Or    ondansetron (ZOFRAN) injection 4 mg  4 mg IntraVENous Q6H PRN    enoxaparin (LOVENOX) injection 40 mg  40 mg SubCUTAneous DAILY    dexAMETHasone (DECADRON) tablet 6 mg  6 mg Oral DAILY        Lab Data Reviewed: (see below)  Lab Review:     Recent Labs     11/30/21  0246 11/29/21  0729 11/28/21  1210   WBC 7.9 4.5 7.9   HGB 10.9* 12.6 12.8   HCT 31.5* 36.4* 36.3*   * 454* 396     Recent Labs     11/30/21  0246 11/29/21  0729 11/28/21  1210   * 131* 128*   K 4.9 4.2 4.0    96* 92*   CO2 21 19* 20*   * 471* 423*   BUN 35* 34* 21*   CREA 0.88 1.18 1.15   CA 8.5 9.1 8.9   ALB 1.9* 2.2* 2.4*   TBILI 0.5 0.5 0.8   ALT 74 64 70     Lab Results   Component Value Date/Time    Glucose (POC) 476 (H) 11/30/2021 10:56 AM    Glucose (POC) 422 (H) 11/30/2021 06:10 AM    Glucose (POC) 465 (H) 11/29/2021 09:44 PM    Glucose (POC) 456 (H) 11/29/2021 03:40 PM    Glucose (POC) 497 (H) 11/29/2021 12:47 PM     No results for input(s): PH, PCO2, PO2, HCO3, FIO2 in the last 72 hours. No results for input(s): INR, INREXT, INREXT in the last 72 hours. All Micro Results     Procedure Component Value Units Date/Time    COVID-19 RAPID TEST [[id number]]  (Abnormal) Collected: 11/28/21 1245    Order Status: Completed Specimen: Nasopharyngeal Updated: 11/28/21 1323     Specimen source Nasopharyngeal        COVID-19 rapid test Detected        Comment: CALLED TO AND READ BACK BY  SUKHWINDER HALL,1320,DK  Rapid Abbott ID Now       The specimen is POSITIVE for SARS-CoV-2, the novel coronavirus associated with COVID-19. This test has been authorized by the FDA under an Emergency Use Authorization (EUA) for use by authorized laboratories. Fact sheet for Healthcare Providers: ConventionUpdate.co.nz  Fact sheet for Patients: ConventionUpdate.co.nz       Methodology: Isothermal Nucleic Acid Amplification               I have reviewed notes of prior 24hr.     Other pertinent lab: Total time spent with patient: 28 I personally reviewed chart, notes, data and current medications in the medical record. I have personally examined and treated the patient at bedside during this period.                  Care Plan discussed with: Patient, Nursing Staff and >50% of time spent in counseling and coordination of care    Discussed:  Care Plan    Prophylaxis:  Lovenox    Disposition:  Home w/Family           ___________________________________________________    Attending Physician: Shira Murcia MD

## 2021-11-30 NOTE — PROGRESS NOTES
[address] visit. Mr. Glyn Hodgkin is [facility]. He is unable to receive communion due to medical restrictions. Prayer for spiritual communion offered outside his room.     DEONTE Zuniga, RN, ACSW, LCSW   Page:  [phone]

## 2021-11-30 NOTE — PROGRESS NOTES
Bedside and Verbal shift change report given to [address] (oncoming nurse) by May RN (offgoing nurse). Report included the following information SBAR, Kardex, ED Summary, Intake/Output, MAR, Recent Results and Cardiac Rhythm NSR.

## 2021-11-30 NOTE — PROGRESS NOTES
Bedside and Verbal shift change report given to May (oncoming nurse) by Madeleine Valderrama (offgoing nurse). Report included the following information SBAR, Kardex and MAR.

## 2021-12-01 LAB
ALBUMIN SERPL-MCNC: 2.1 G/DL (ref 3.5–5)
ALBUMIN/GLOB SERPL: 0.4 {RATIO} (ref 1.1–2.2)
ALP SERPL-CCNC: 51 U/L (ref 45–117)
ALT SERPL-CCNC: 69 U/L (ref 12–78)
ANION GAP SERPL CALC-SCNC: 10 MMOL/L (ref 5–15)
AST SERPL-CCNC: 42 U/L (ref 15–37)
BILIRUB SERPL-MCNC: 0.3 MG/DL (ref 0.2–1)
BUN SERPL-MCNC: 24 MG/DL (ref 6–20)
BUN/CREAT SERPL: 31 (ref 12–20)
CALCIUM SERPL-MCNC: 8.4 MG/DL (ref 8.5–10.1)
CHLORIDE SERPL-SCNC: 104 MMOL/L (ref 97–108)
CO2 SERPL-SCNC: 21 MMOL/L (ref 21–32)
COMMENT, HOLDF: NORMAL
CREAT SERPL-MCNC: 0.78 MG/DL (ref 0.7–1.3)
CRP SERPL-MCNC: 4.97 MG/DL (ref 0–0.6)
FERRITIN SERPL-MCNC: 1305 NG/ML (ref 26–388)
GLOBULIN SER CALC-MCNC: 4.7 G/DL (ref 2–4)
GLUCOSE BLD STRIP.AUTO-MCNC: 248 MG/DL (ref 65–117)
GLUCOSE BLD STRIP.AUTO-MCNC: 275 MG/DL (ref 65–117)
GLUCOSE BLD STRIP.AUTO-MCNC: 278 MG/DL (ref 65–117)
GLUCOSE BLD STRIP.AUTO-MCNC: 353 MG/DL (ref 65–117)
GLUCOSE SERPL-MCNC: 286 MG/DL (ref 65–100)
POTASSIUM SERPL-SCNC: 4.3 MMOL/L (ref 3.5–5.1)
PROT SERPL-MCNC: 6.8 G/DL (ref 6.4–8.2)
SAMPLES BEING HELD,HOLD: NORMAL
SERVICE CMNT-IMP: ABNORMAL
SODIUM SERPL-SCNC: 135 MMOL/L (ref 136–145)

## 2021-12-01 PROCEDURE — 36415 COLL VENOUS BLD VENIPUNCTURE: CPT

## 2021-12-01 PROCEDURE — 65660000000 HC RM CCU STEPDOWN

## 2021-12-01 PROCEDURE — 74011250637 HC RX REV CODE- 250/637: Performed by: INTERNAL MEDICINE

## 2021-12-01 PROCEDURE — 80053 COMPREHEN METABOLIC PANEL: CPT

## 2021-12-01 PROCEDURE — 74011250636 HC RX REV CODE- 250/636: Performed by: INTERNAL MEDICINE

## 2021-12-01 PROCEDURE — 74011636637 HC RX REV CODE- 636/637: Performed by: INTERNAL MEDICINE

## 2021-12-01 PROCEDURE — 82962 GLUCOSE BLOOD TEST: CPT

## 2021-12-01 PROCEDURE — 77010033711 HC HIGH FLOW OXYGEN

## 2021-12-01 PROCEDURE — 86140 C-REACTIVE PROTEIN: CPT

## 2021-12-01 PROCEDURE — 82728 ASSAY OF FERRITIN: CPT

## 2021-12-01 RX ORDER — LOSARTAN POTASSIUM 50 MG/1
50 TABLET ORAL DAILY
Status: DISCONTINUED | OUTPATIENT
Start: 2021-12-02 | End: 2021-12-06 | Stop reason: HOSPADM

## 2021-12-01 RX ORDER — HYDRALAZINE HYDROCHLORIDE 20 MG/ML
20 INJECTION INTRAMUSCULAR; INTRAVENOUS
Status: DISCONTINUED | OUTPATIENT
Start: 2021-12-01 | End: 2021-12-06 | Stop reason: HOSPADM

## 2021-12-01 RX ADMIN — INSULIN LISPRO 14 UNITS: 100 INJECTION, SOLUTION INTRAVENOUS; SUBCUTANEOUS at 12:21

## 2021-12-01 RX ADMIN — SPIRONOLACTONE 25 MG: 25 TABLET ORAL at 08:13

## 2021-12-01 RX ADMIN — INSULIN LISPRO 14 UNITS: 100 INJECTION, SOLUTION INTRAVENOUS; SUBCUTANEOUS at 16:55

## 2021-12-01 RX ADMIN — INSULIN LISPRO 5 UNITS: 100 INJECTION, SOLUTION INTRAVENOUS; SUBCUTANEOUS at 12:21

## 2021-12-01 RX ADMIN — DEXAMETHASONE 6 MG: 6 TABLET ORAL at 08:13

## 2021-12-01 RX ADMIN — BARICITINIB 4 MG: 2 TABLET, FILM COATED ORAL at 08:13

## 2021-12-01 RX ADMIN — SODIUM CHLORIDE 125 ML/HR: 9 INJECTION, SOLUTION INTRAVENOUS at 02:44

## 2021-12-01 RX ADMIN — ATORVASTATIN CALCIUM 80 MG: 20 TABLET, FILM COATED ORAL at 08:13

## 2021-12-01 RX ADMIN — INSULIN LISPRO 3 UNITS: 100 INJECTION, SOLUTION INTRAVENOUS; SUBCUTANEOUS at 08:15

## 2021-12-01 RX ADMIN — INSULIN LISPRO 6 UNITS: 100 INJECTION, SOLUTION INTRAVENOUS; SUBCUTANEOUS at 16:59

## 2021-12-01 RX ADMIN — Medication 10 ML: at 13:18

## 2021-12-01 RX ADMIN — Medication 10 ML: at 21:33

## 2021-12-01 RX ADMIN — METOPROLOL TARTRATE 50 MG: 50 TABLET, FILM COATED ORAL at 08:13

## 2021-12-01 RX ADMIN — INSULIN LISPRO 3 UNITS: 100 INJECTION, SOLUTION INTRAVENOUS; SUBCUTANEOUS at 21:33

## 2021-12-01 RX ADMIN — TICAGRELOR 60 MG: 60 TABLET ORAL at 08:14

## 2021-12-01 RX ADMIN — INSULIN HUMAN 30 UNITS: 100 INJECTION, SUSPENSION SUBCUTANEOUS at 08:12

## 2021-12-01 RX ADMIN — TICAGRELOR 60 MG: 60 TABLET ORAL at 17:35

## 2021-12-01 RX ADMIN — INSULIN HUMAN 30 UNITS: 100 INJECTION, SUSPENSION SUBCUTANEOUS at 16:57

## 2021-12-01 RX ADMIN — Medication 10 ML: at 06:00

## 2021-12-01 RX ADMIN — ASPIRIN 81 MG: 81 TABLET, COATED ORAL at 08:13

## 2021-12-01 RX ADMIN — ENOXAPARIN SODIUM 40 MG: 100 INJECTION SUBCUTANEOUS at 08:12

## 2021-12-01 RX ADMIN — GLIPIZIDE 5 MG: 5 TABLET ORAL at 08:13

## 2021-12-01 RX ADMIN — INSULIN LISPRO 14 UNITS: 100 INJECTION, SOLUTION INTRAVENOUS; SUBCUTANEOUS at 08:12

## 2021-12-01 RX ADMIN — GLIPIZIDE 5 MG: 5 TABLET ORAL at 17:00

## 2021-12-01 RX ADMIN — LOSARTAN POTASSIUM 25 MG: 25 TABLET, FILM COATED ORAL at 08:13

## 2021-12-01 RX ADMIN — METOPROLOL TARTRATE 50 MG: 50 TABLET, FILM COATED ORAL at 17:00

## 2021-12-01 NOTE — PROGRESS NOTES
1030: called into patients room for sats sitting in the 80's on 6L. Upon entering room patient was on 4L and sitting up at rest sats bouncing between 82/83%. Bumped patient up to 5L, sitting at rest and sats come up to 85/86%. Advised patient to lie in the prone position and increased O2 to 6L. If patient is in prone position sats remain above 90% on 6L. Once patient sits up even at rest sats drop to the low 80's.    1045: MD made aware patient is at 6L O2. No new orders at this time. 1330: Consulted charge nurse on patients sats only staying in the 90's on 6L when in prone position. Primary RN advised to document and reach out to MD to see if more oxygen is required. Will continue to monitor. 1345: Went into patients room to see how much oxygen he needed while sitting. Patient needed a level of 10L while sitting at rest in order to maintain a saturation above 90%. MD made aware. 1347: MD ordered mid flow. RT to come and set up. Patient to be transferred to a different floor.

## 2021-12-01 NOTE — PROGRESS NOTES
TRANSFER - IN REPORT:    Verbal report received from Festus Sesay 99 RN(name) on Iram Meeks  being received from 5th floor (unit) for routine progression of care      Report consisted of patients Situation, Background, Assessment and   Recommendations(SBAR). Information from the following report(s) SBAR, Kardex, ED Summary, Intake/Output, MAR and Recent Results was reviewed with the receiving nurse. Opportunity for questions and clarification was provided. Assessment completed upon patients arrival to unit and care assumed. 165Ailin SORENSEN aware of  ordered to give extra 6 units to make 20 units      Bedside shift change report given to Yana Hunter (oncoming nurse) by Marii Bassett RN (offgoing nurse). Report included the following information SBAR, Kardex, ED Summary, [facility] and Recent Results.

## 2021-12-01 NOTE — PROGRESS NOTES
Due to Contact Restrictions did not enter the room. Provided prayer at [facility] patient's doorway. Did not include sacramental care.       Mayra Biggs

## 2021-12-01 NOTE — PROGRESS NOTES
Eleazar Espinozaelsen [facility] 79  [address] [facility], [place], [address] [place] [address]  [phone]      Medical Progress Note      NAME: Anthony Araiza   :  1970  MRM:  [id number]    Date of service: 2021  7:05 AM       Assessment and Plan:   1. Acute hypoxic respiratory failure due to COVID 19. Continue supplement O2 to keep SAO2 > 90%     2. Covid 19 pneumonia. Continue decadron, baricitinib; trend inflammatory markers    3. DM2 with hyperglycemia - poorly controlled. A1C: 11.7. SSI. Worsened BG due to steroid. Started on NPH and lispro with meals. Continue SSI      4. Hyponatremia. Improving. continue IVF and monitor     5. CAD s/p stents - on lipitor, brilinta, asa     6. HTN. Not well controlled. Increased losartan to 50 mg, continue metoprolol and spironolactone. 7.  Obesity - BMI 32.54. would benefit from weight loss    Pt is from [place], here visiting. Asking to be discharged despite on 4L of O2. I'm not sure if there is a concentrator to get him to [place]. Subjective:     Chief Complaint[de-identified] Patient was seen and examined as a follow up for COVID. Chart was reviewed. c/o cough and SOB    ROS:  (bold if positive, if negative)    Tolerating PT  Tolerating Diet        Objective:     Last 24hrs VS reviewed since prior progress note.  Most recent are:    Visit Vitals  BP (!) 168/87 (BP 1 Location: Right upper arm, BP Patient Position: At rest)   Pulse 68   Temp 97.7 °F (36.5 °C)   Resp 18   Ht 6' 3\" (1.905 m)   Wt 118.1 kg (260 lb 5.8 oz)   SpO2 97%   BMI 32.54 kg/m²     SpO2 Readings from Last 6 Encounters:   21 97%    O2 Flow Rate (L/min): 4 l/min     No intake or output data in the 24 hours ending 21 1049     Physical Exam:    Gen:  Well-developed, well-nourished, in no acute distress  HEENT:  Pink conjunctivae, PERRL, hearing intact to voice, moist mucous membranes  Neck:  Supple, without masses, thyroid non-tender  Resp:  No accessory muscle use, clear breath sounds without wheezes rales or rhonchi  Card:  No murmurs, normal S1, S2 without thrills, bruits or peripheral edema  Abd:  Soft, non-tender, non-distended, normoactive bowel sounds are present, no palpable organomegaly and no detectable hernias  Lymph:  No cervical or inguinal adenopathy  Musc:  No cyanosis or clubbing  Skin:  No rashes or ulcers, skin turgor is good  Neuro:  Cranial nerves are grossly intact, no focal motor weakness, follows commands appropriately  Psych:  Good insight, oriented to person, place and time, alert  __________________________________________________________________  Medications Reviewed: (see below)  Medications:     Current Facility-Administered Medications   Medication Dose Route Frequency    [START ON 12/2/2021] losartan (COZAAR) tablet 50 mg  50 mg Oral DAILY    insulin NPH (NOVOLIN N, HUMULIN N) injection 30 Units  30 Units SubCUTAneous ACB&D    insulin lispro (HUMALOG) injection 14 Units  14 Units SubCUTAneous TIDAC    insulin lispro (HUMALOG) injection   SubCUTAneous AC&HS    glucose chewable tablet 16 g  4 Tablet Oral PRN    dextrose (D50W) injection syrg 12.5-25 g  12.5-25 g IntraVENous PRN    glucagon (GLUCAGEN) injection 1 mg  1 mg IntraMUSCular PRN    0.9% sodium chloride infusion  125 mL/hr IntraVENous CONTINUOUS    aspirin delayed-release tablet 81 mg  81 mg Oral DAILY    atorvastatin (LIPITOR) tablet 80 mg  80 mg Oral DAILY    glipiZIDE (GLUCOTROL) tablet 5 mg  5 mg Oral BID    metoprolol tartrate (LOPRESSOR) tablet 50 mg  50 mg Oral BID    spironolactone (ALDACTONE) tablet 25 mg  25 mg Oral DAILY    ticagrelor (BRILINTA) tablet 60 mg  60 mg Oral BID    baricitinib (OLUMIANT) tablet 4 mg  4 mg Oral DAILY    sodium chloride (NS) flush 5-40 mL  5-40 mL IntraVENous Q8H    sodium chloride (NS) flush 5-40 mL  5-40 mL IntraVENous PRN    acetaminophen (TYLENOL) tablet 650 mg  650 mg Oral Q6H PRN    Or    acetaminophen (TYLENOL) suppository 650 mg  650 mg Rectal Q6H PRN    polyethylene glycol (MIRALAX) packet 17 g  17 g Oral DAILY PRN    ondansetron (ZOFRAN ODT) tablet 4 mg  4 mg Oral Q8H PRN    Or    ondansetron (ZOFRAN) injection 4 mg  4 mg IntraVENous Q6H PRN    enoxaparin (LOVENOX) injection 40 mg  40 mg SubCUTAneous DAILY    dexAMETHasone (DECADRON) tablet 6 mg  6 mg Oral DAILY        Lab Data Reviewed: (see below)  Lab Review:     Recent Labs     11/30/21  0246 11/29/21  0729 11/28/21  1210   WBC 7.9 4.5 7.9   HGB 10.9* 12.6 12.8   HCT 31.5* 36.4* 36.3*   * 454* 396     Recent Labs     12/01/21  0250 11/30/21  0246 11/29/21  0729   * 132* 131*   K 4.3 4.9 4.2    103 96*   CO2 21 21 19*   * 387* 471*   BUN 24* 35* 34*   CREA 0.78 0.88 1.18   CA 8.4* 8.5 9.1   ALB 2.1* 1.9* 2.2*   TBILI 0.3 0.5 0.5   ALT 69 74 64     Lab Results   Component Value Date/Time    Glucose (POC) 248 (H) 12/01/2021 07:09 AM    Glucose (POC) 356 (H) 11/30/2021 09:24 PM    Glucose (POC) 383 (H) 11/30/2021 04:25 PM    Glucose (POC) 476 (H) 11/30/2021 10:56 AM    Glucose (POC) 422 (H) 11/30/2021 06:10 AM     No results for input(s): PH, PCO2, PO2, HCO3, FIO2 in the last 72 hours. No results for input(s): INR, INREXT, INREXT in the last 72 hours. All Micro Results     Procedure Component Value Units Date/Time    COVID-19 RAPID TEST [[id number]]  (Abnormal) Collected: 11/28/21 1245    Order Status: Completed Specimen: Nasopharyngeal Updated: 11/28/21 1323     Specimen source Nasopharyngeal        COVID-19 rapid test Detected        Comment: CALLED TO AND READ BACK BY  SUKHWINDER HALL,1320,DK  Rapid Abbott ID Now       The specimen is POSITIVE for SARS-CoV-2, the novel coronavirus associated with COVID-19. This test has been authorized by the FDA under an Emergency Use Authorization (EUA) for use by authorized laboratories.         Fact sheet for Healthcare Providers: ConventionUpdate.co.nz  Fact sheet for Patients: [facility]       Methodology: Isothermal Nucleic Acid Amplification               I have reviewed notes of prior 24hr. Other pertinent lab: Total time spent with patient: 28 I personally reviewed chart, notes, data and current medications in the medical record. I have personally examined and treated the patient at bedside during this period.                  Care Plan discussed with: Patient, Nursing Staff and >50% of time spent in counseling and coordination of care    Discussed:  Care Plan    Prophylaxis:  Lovenox    Disposition:  Home w/Family           ___________________________________________________    Attending Physician: Víctor Umanzor MD

## 2021-12-02 LAB
COMMENT, HOLDF: NORMAL
D DIMER PPP FEU-MCNC: 2.15 MG/L FEU (ref 0–0.65)
FERRITIN SERPL-MCNC: 1191 NG/ML (ref 26–388)
GLUCOSE BLD STRIP.AUTO-MCNC: 152 MG/DL (ref 65–117)
GLUCOSE BLD STRIP.AUTO-MCNC: 238 MG/DL (ref 65–117)
GLUCOSE BLD STRIP.AUTO-MCNC: 277 MG/DL (ref 65–117)
GLUCOSE BLD STRIP.AUTO-MCNC: 324 MG/DL (ref 65–117)
SAMPLES BEING HELD,HOLD: NORMAL
SERVICE CMNT-IMP: ABNORMAL

## 2021-12-02 PROCEDURE — 74011250637 HC RX REV CODE- 250/637: Performed by: INTERNAL MEDICINE

## 2021-12-02 PROCEDURE — 65660000000 HC RM CCU STEPDOWN

## 2021-12-02 PROCEDURE — 94760 N-INVAS EAR/PLS OXIMETRY 1: CPT

## 2021-12-02 PROCEDURE — 82728 ASSAY OF FERRITIN: CPT

## 2021-12-02 PROCEDURE — 85379 FIBRIN DEGRADATION QUANT: CPT

## 2021-12-02 PROCEDURE — 77010033711 HC HIGH FLOW OXYGEN

## 2021-12-02 PROCEDURE — 74011250636 HC RX REV CODE- 250/636: Performed by: INTERNAL MEDICINE

## 2021-12-02 PROCEDURE — 74011636637 HC RX REV CODE- 636/637: Performed by: INTERNAL MEDICINE

## 2021-12-02 PROCEDURE — 82962 GLUCOSE BLOOD TEST: CPT

## 2021-12-02 PROCEDURE — 77030012794 HC KT NSL CANN/HGH TRAN -A

## 2021-12-02 PROCEDURE — 36415 COLL VENOUS BLD VENIPUNCTURE: CPT

## 2021-12-02 RX ORDER — DEXAMETHASONE SODIUM PHOSPHATE 10 MG/ML
10 INJECTION INTRAMUSCULAR; INTRAVENOUS EVERY 12 HOURS
Status: DISCONTINUED | OUTPATIENT
Start: 2021-12-02 | End: 2021-12-06

## 2021-12-02 RX ORDER — DEXAMETHASONE 6 MG/1
6 TABLET ORAL EVERY 12 HOURS
Status: DISCONTINUED | OUTPATIENT
Start: 2021-12-02 | End: 2021-12-02

## 2021-12-02 RX ORDER — INSULIN LISPRO 100 [IU]/ML
10 INJECTION, SOLUTION INTRAVENOUS; SUBCUTANEOUS
Status: DISCONTINUED | OUTPATIENT
Start: 2021-12-02 | End: 2021-12-04

## 2021-12-02 RX ADMIN — DEXAMETHASONE 6 MG: 6 TABLET ORAL at 08:38

## 2021-12-02 RX ADMIN — Medication 10 ML: at 05:05

## 2021-12-02 RX ADMIN — INSULIN LISPRO 5 UNITS: 100 INJECTION, SOLUTION INTRAVENOUS; SUBCUTANEOUS at 08:40

## 2021-12-02 RX ADMIN — INSULIN HUMAN 30 UNITS: 100 INJECTION, SUSPENSION SUBCUTANEOUS at 08:40

## 2021-12-02 RX ADMIN — GLIPIZIDE 5 MG: 5 TABLET ORAL at 17:32

## 2021-12-02 RX ADMIN — INSULIN LISPRO 4 UNITS: 100 INJECTION, SOLUTION INTRAVENOUS; SUBCUTANEOUS at 22:30

## 2021-12-02 RX ADMIN — METOPROLOL TARTRATE 50 MG: 50 TABLET, FILM COATED ORAL at 08:38

## 2021-12-02 RX ADMIN — ENOXAPARIN SODIUM 40 MG: 100 INJECTION SUBCUTANEOUS at 08:39

## 2021-12-02 RX ADMIN — INSULIN LISPRO 5 UNITS: 100 INJECTION, SOLUTION INTRAVENOUS; SUBCUTANEOUS at 17:32

## 2021-12-02 RX ADMIN — Medication 10 ML: at 22:32

## 2021-12-02 RX ADMIN — Medication 10 ML: at 17:33

## 2021-12-02 RX ADMIN — GLIPIZIDE 5 MG: 5 TABLET ORAL at 08:38

## 2021-12-02 RX ADMIN — LOSARTAN POTASSIUM 50 MG: 50 TABLET, FILM COATED ORAL at 08:38

## 2021-12-02 RX ADMIN — INSULIN LISPRO 14 UNITS: 100 INJECTION, SOLUTION INTRAVENOUS; SUBCUTANEOUS at 08:40

## 2021-12-02 RX ADMIN — SPIRONOLACTONE 25 MG: 25 TABLET ORAL at 08:38

## 2021-12-02 RX ADMIN — INSULIN LISPRO 10 UNITS: 100 INJECTION, SOLUTION INTRAVENOUS; SUBCUTANEOUS at 17:32

## 2021-12-02 RX ADMIN — BARICITINIB 4 MG: 2 TABLET, FILM COATED ORAL at 08:38

## 2021-12-02 RX ADMIN — DEXAMETHASONE SODIUM PHOSPHATE 10 MG: 10 INJECTION, SOLUTION INTRAMUSCULAR; INTRAVENOUS at 20:49

## 2021-12-02 RX ADMIN — INSULIN LISPRO 10 UNITS: 100 INJECTION, SOLUTION INTRAVENOUS; SUBCUTANEOUS at 12:09

## 2021-12-02 RX ADMIN — ASPIRIN 81 MG: 81 TABLET, COATED ORAL at 08:38

## 2021-12-02 RX ADMIN — TICAGRELOR 60 MG: 60 TABLET ORAL at 18:00

## 2021-12-02 RX ADMIN — TICAGRELOR 60 MG: 60 TABLET ORAL at 08:58

## 2021-12-02 RX ADMIN — ATORVASTATIN CALCIUM 80 MG: 20 TABLET, FILM COATED ORAL at 08:38

## 2021-12-02 RX ADMIN — INSULIN HUMAN 30 UNITS: 100 INJECTION, SUSPENSION SUBCUTANEOUS at 17:32

## 2021-12-02 RX ADMIN — METOPROLOL TARTRATE 50 MG: 50 TABLET, FILM COATED ORAL at 17:32

## 2021-12-02 RX ADMIN — SODIUM CHLORIDE 125 ML/HR: 9 INJECTION, SOLUTION INTRAVENOUS at 02:28

## 2021-12-02 NOTE — PROGRESS NOTES
1915- Bedside and Verbal shift change report given to Aayush Arana (oncoming nurse) by Yahaira Bacon (offgoing nurse). Report included the following information SBAR, Intake/Output, Recent Results and Cardiac Rhythm NSR.    2030- On-call reached out to for hypertension. PRN orders for Hydralazine obtained. BP rechecked. Not needed at this time. This patient was assisted with Intentional Toileting every 2 hours during this shift as appropriate. Documentation of ambulation and output reflected on Flowsheet as appropriate. Purposeful hourly rounding was completed using AIDET and 5Ps. Outcomes of PHR documented as they occurred. Bed alarm in use as appropriate. Dual Suction and ambubag in place. 0730- Bedside and Verbal shift change report given to Yahaira Bacon (oncoming nurse) by Aayush Arana (offgoing nurse). Report included the following information SBAR, Intake/Output, Recent Results and Cardiac Rhythm NSR.

## 2021-12-02 NOTE — PROGRESS NOTES
Due to Contact Restrictions did not enter the room. Provided prayer at [facility] patient's doorway. Did not include sacramental care. Called pt wife providing pastoral support and assurance of prayer. Due to Contact Restrictions did not enter the room. Provided prayer at [facility] patient's doorway. Did not include sacramental care. Called pt wife providing pastoral support and assurance of prayer. Due to Contact Restrictions did not enter the room. Provided prayer at [facility] patient's doorway. Did not include sacramental care. Called pt wife providing pastoral support and assurance of prayer. Due to Contact Restrictions did not enter the room. Provided prayer at [facility] patient's doorway. Did not include sacramental care. Called pt wife providing pastoral support and assurance of prayer. Due to Contact Restrictions did not enter the room. Provided prayer at [facility] patient's doorway. Did not include sacramental care. Called pt wife providing pastoral support and assurance of prayer.

## 2021-12-02 NOTE — PROGRESS NOTES
Nutrition Note    Adjusted diet to 4 carb choice 2/2 A1c of 11.7 and elevated BG. Pt on Decadron. , 387, 471, 423 mg/dL. , 152, 238, 275 mg/dL.      Patient Vitals for the past 168 hrs:   % Diet Eaten   12/02/21 0700 0%   12/01/21 1530 76 - 100%       Electronically signed by Ade Weldon RD on 12/2/2021     Contact: [phone]

## 2021-12-02 NOTE — PROGRESS NOTES
[address] visit. Mr. Jeramy Post is [facility]. He is unable to receive communion due to medical restrictions. Prayer for spiritual communion offered for his well-being.     DEONTE Ferraro, RN, ACSW, LCSW   Page:  [phone]

## 2021-12-02 NOTE — PROGRESS NOTES
Care Management follow up    Patient admitted for acute hypoxic respiratory failure, COVID19+, pneumonia. A1C = 11.7. History of: diabetes, CAD with stents, HTN. COVID Vaccine:  yes  type: Pfizer    date completed April, 2021    RUR 7 (Score %) low   Is This a Readmission NO  Is this a Bundle NO    Current status  Patient discussed during interdisciplinary rounds. Patient continues to require medical management for COVID19. Currently on 10L mid-flow oxygen, po decadron. [place] is [place] and is here visiting family. Transition of Care Plan  1. Monitor patient status and response to treatment. 2. Patient continues to require medical management. 3. Home with family assist at DC. 4. Will monitor for home oxygen needs. 5. CM to monitor progress and recommendations.     Orville Antoine RN, MSN/Care manager

## 2021-12-02 NOTE — PROGRESS NOTES
[facility]  [address] [facility], [address] [place] [address]  [phone]      Medical Progress Note      NAME: Kristan Gutierrez   :  1970  MRM:  [id number]    Date of service: 2021  7:05 AM       Assessment and Plan:   1. Acute hypoxic respiratory failure due to COVID 19. Worsening. Now on high flow O2. Continue supplement O2 to keep SAO2 > 90%. Consult pulmonary     2. Covid 19 pneumonia. Continue decadron(increased to BID ), baricitinib; trend inflammatory markers. Encourage prone position. 3. DM2 with hyperglycemia - poorly controlled. A1C: 11.7. SSI. Worsened BG due to steroid. Started on NPH and lispro with meals. Continue SSI      4. Hyponatremia. Improving. continue IVF and monitor     5. CAD s/p stents - on lipitor, brilinta, asa     6. HTN. Not well controlled. Increased losartan to 50 mg, continue metoprolol and spironolactone. 7.  Obesity - BMI 32.54. would benefit from weight loss    Pt is from [place], here visiting. Asking to be discharged despite on 4L of O2. I'm not sure if there is a concentrator to get him to [place]. Subjective:     Chief Complaint[de-identified] Patient was seen and examined as a follow up for COVID. Chart was reviewed. c/o cough and SOB    ROS:  (bold if positive, if negative)    Tolerating PT  Tolerating Diet        Objective:     Last 24hrs VS reviewed since prior progress note.  Most recent are:    Visit Vitals  BP (!) 142/67 (BP 1 Location: Left arm, BP Patient Position: At rest)   Pulse 76   Temp 99.1 °F (37.3 °C)   Resp 16   Ht 6' 3\" (1.905 m)   Wt 118.1 kg (260 lb 5.8 oz)   SpO2 92%   BMI 32.54 kg/m²     SpO2 Readings from Last 6 Encounters:   21 92%    O2 Flow Rate (L/min): 10 l/min       Intake/Output Summary (Last 24 hours) at 2021 1149  Last data filed at 2021 0700  Gross per 24 hour   Intake 360 ml   Output    Net 360 ml        Physical Exam:    Gen:  Well-developed, well-nourished, in no acute distress  HEENT:  Pink conjunctivae, PERRL, hearing intact to voice, moist mucous membranes  Neck:  Supple, without masses, thyroid non-tender  Resp:  No accessory muscle use, clear breath sounds without wheezes rales or rhonchi  Card:  No murmurs, normal S1, S2 without thrills, bruits or peripheral edema  Abd:  Soft, non-tender, non-distended, normoactive bowel sounds are present, no palpable organomegaly and no detectable hernias  Lymph:  No cervical or inguinal adenopathy  Musc:  No cyanosis or clubbing  Skin:  No rashes or ulcers, skin turgor is good  Neuro:  Cranial nerves are grossly intact, no focal motor weakness, follows commands appropriately  Psych:  Good insight, oriented to person, place and time, alert  __________________________________________________________________  Medications Reviewed: (see below)  Medications:     Current Facility-Administered Medications   Medication Dose Route Frequency    losartan (COZAAR) tablet 50 mg  50 mg Oral DAILY    hydrALAZINE (APRESOLINE) 20 mg/mL injection 20 mg  20 mg IntraVENous Q6H PRN    insulin NPH (NOVOLIN N, HUMULIN N) injection 30 Units  30 Units SubCUTAneous ACB&D    insulin lispro (HUMALOG) injection 14 Units  14 Units SubCUTAneous TIDAC    insulin lispro (HUMALOG) injection   SubCUTAneous AC&HS    glucose chewable tablet 16 g  4 Tablet Oral PRN    dextrose (D50W) injection syrg 12.5-25 g  12.5-25 g IntraVENous PRN    glucagon (GLUCAGEN) injection 1 mg  1 mg IntraMUSCular PRN    0.9% sodium chloride infusion  125 mL/hr IntraVENous CONTINUOUS    aspirin delayed-release tablet 81 mg  81 mg Oral DAILY    atorvastatin (LIPITOR) tablet 80 mg  80 mg Oral DAILY    glipiZIDE (GLUCOTROL) tablet 5 mg  5 mg Oral BID    metoprolol tartrate (LOPRESSOR) tablet 50 mg  50 mg Oral BID    spironolactone (ALDACTONE) tablet 25 mg  25 mg Oral DAILY    ticagrelor (BRILINTA) tablet 60 mg  60 mg Oral BID    baricitinib (OLUMIANT) tablet 4 mg  4 mg Oral DAILY    sodium chloride (NS) flush 5-40 mL  5-40 mL IntraVENous Q8H    sodium chloride (NS) flush 5-40 mL  5-40 mL IntraVENous PRN    acetaminophen (TYLENOL) tablet 650 mg  650 mg Oral Q6H PRN    Or    acetaminophen (TYLENOL) suppository 650 mg  650 mg Rectal Q6H PRN    polyethylene glycol (MIRALAX) packet 17 g  17 g Oral DAILY PRN    ondansetron (ZOFRAN ODT) tablet 4 mg  4 mg Oral Q8H PRN    Or    ondansetron (ZOFRAN) injection 4 mg  4 mg IntraVENous Q6H PRN    enoxaparin (LOVENOX) injection 40 mg  40 mg SubCUTAneous DAILY    dexAMETHasone (DECADRON) tablet 6 mg  6 mg Oral DAILY        Lab Data Reviewed: (see below)  Lab Review:     Recent Labs     11/30/21  0246   WBC 7.9   HGB 10.9*   HCT 31.5*   *     Recent Labs     12/01/21  0250 11/30/21  0246   * 132*   K 4.3 4.9    103   CO2 21 21   * 387*   BUN 24* 35*   CREA 0.78 0.88   CA 8.4* 8.5   ALB 2.1* 1.9*   TBILI 0.3 0.5   ALT 69 74     Lab Results   Component Value Date/Time    Glucose (POC) 152 (H) 12/02/2021 11:15 AM    Glucose (POC) 238 (H) 12/02/2021 07:48 AM    Glucose (POC) 275 (H) 12/01/2021 09:18 PM    Glucose (POC) 353 (H) 12/01/2021 03:49 PM    Glucose (POC) 278 (H) 12/01/2021 12:13 PM     No results for input(s): PH, PCO2, PO2, HCO3, FIO2 in the last 72 hours. No results for input(s): INR, INREXT, INREXT in the last 72 hours. All Micro Results     Procedure Component Value Units Date/Time    COVID-19 RAPID TEST [[id number]]  (Abnormal) Collected: 11/28/21 1245    Order Status: Completed Specimen: Nasopharyngeal Updated: 11/28/21 1323     Specimen source Nasopharyngeal        COVID-19 rapid test Detected        Comment: CALLED TO AND READ BACK BY  SUKHWINDER HALL,[id number],DK  Rapid Abbott ID Now       The specimen is POSITIVE for SARS-CoV-2, the novel coronavirus associated with COVID-19. This test has been authorized by the FDA under an Emergency Use Authorization (EUA) for use by authorized laboratories.         Fact sheet for Healthcare Providers: ConventionUpdate.co.nz  Fact sheet for Patients: ConventionUpdate.co.nz       Methodology: Isothermal Nucleic Acid Amplification               I have reviewed notes of prior 24hr. Other pertinent lab: Total time spent with patient: 28 I personally reviewed chart, notes, data and current medications in the medical record. I have personally examined and treated the patient at bedside during this period.                  Care Plan discussed with: Patient, Nursing Staff and >50% of time spent in counseling and coordination of care    Discussed:  Care Plan    Prophylaxis:  Lovenox    Disposition:  Home w/Family           ___________________________________________________    Attending Physician: Erika Arnold MD

## 2021-12-02 NOTE — CONSULTS
Name: [facility]: [facility]   : 1970 Admit Date: 2021   Phone: [phone]  Room: [facility]/01   PCP: Ciara Bryant MD  MRN: [id number]   Date: 2021  Code: Full Code          Chart and notes reviewed. Data reviewed. I review the patient's current medications in the medical record at each encounter. I have evaluated and examined the patient. HPI:    4:49 PM       History was obtained from patient. I was asked by Liyah Ahuja DO to see Vangie Sanchez in consultation for a chief complaint of COVID-19 and acute respiratory failure with hypoxia. History of Present Illness:  Mr. Sukhdev Soliman is a 55yo gentleman with a history of CAD s/p PIC, DM, and obesity who is admitted with COVID-19 and acute respiratory failure with hypoxia. He was admitted on  and had described symptoms that had begun about a week prior. He had dirrhea initially that had resolved, but had progressive shortness of breath on exertion that prompted admission. He has had a progressively increasing O2 requirement and is currently on HF at 25L. He is received baricitinib and steroids. He is frustrated that he was not initially treated with HF NC despite not needing this until more recently.     Labs: WBC 7.9, HGb 10.9, , cr 0.78, CRP 4.97, d-dimer 2.15    Images reviewed:  CXR 2021: faint opacities on the right      Social History     Tobacco Use    Smoking status: Not on file    Smokeless tobacco: Not on file   Substance Use Topics    Alcohol use: Not on file       No Known Allergies    Current Facility-Administered Medications   Medication Dose Route Frequency    insulin lispro (HUMALOG) injection 10 Units  10 Units SubCUTAneous TIDAC    dexamethasone (PF) (DECADRON) 10 mg/mL injection 10 mg  10 mg IntraVENous Q12H    losartan (COZAAR) tablet 50 mg  50 mg Oral DAILY    hydrALAZINE (APRESOLINE) 20 mg/mL injection 20 mg  20 mg IntraVENous Q6H PRN    insulin NPH ([place] N, HUMULIN N) injection 30 Units  30 Units SubCUTAneous ACB&D    insulin lispro (HUMALOG) injection   SubCUTAneous AC&HS    glucose chewable tablet 16 g  4 Tablet Oral PRN    dextrose (D50W) injection syrg 12.5-25 g  12.5-25 g IntraVENous PRN    glucagon (GLUCAGEN) injection 1 mg  1 mg IntraMUSCular PRN    aspirin delayed-release tablet 81 mg  81 mg Oral DAILY    atorvastatin (LIPITOR) tablet 80 mg  80 mg Oral DAILY    glipiZIDE (GLUCOTROL) tablet 5 mg  5 mg Oral BID    metoprolol tartrate (LOPRESSOR) tablet 50 mg  50 mg Oral BID    spironolactone (ALDACTONE) tablet 25 mg  25 mg Oral DAILY    ticagrelor (BRILINTA) tablet 60 mg  60 mg Oral BID    baricitinib (OLUMIANT) tablet 4 mg  4 mg Oral DAILY    sodium chloride (NS) flush 5-40 mL  5-40 mL IntraVENous Q8H    sodium chloride (NS) flush 5-40 mL  5-40 mL IntraVENous PRN    acetaminophen (TYLENOL) tablet 650 mg  650 mg Oral Q6H PRN    Or    acetaminophen (TYLENOL) suppository 650 mg  650 mg Rectal Q6H PRN    polyethylene glycol (MIRALAX) packet 17 g  17 g Oral DAILY PRN    ondansetron (ZOFRAN ODT) tablet 4 mg  4 mg Oral Q8H PRN    Or    ondansetron (ZOFRAN) injection 4 mg  4 mg IntraVENous Q6H PRN    enoxaparin (LOVENOX) injection 40 mg  40 mg SubCUTAneous DAILY         REVIEW OF SYSTEMS   12 point ROS negative except as stated in the HPI. Physical Exam:   Visit Vitals  BP (!) 154/89 (BP 1 Location: Left arm, BP Patient Position: At rest)   Pulse 72   Temp 99.1 °F (37.3 °C)   Resp 16   Ht 6' 3\" (1.905 m)   Wt 118.1 kg (260 lb 5.8 oz)   SpO2 95%   BMI 32.54 kg/m²       General:  Alert, cooperative, no distress, appears stated age. Head:  Normocephalic, without obvious abnormality, atraumatic. Eyes:  Conjunctivae/corneas clear. Nose: Nares normal. Septum midline.  Mucosa normal.    Throat: Lips, mucosa, and tongue normal.    Neck: Supple, symmetrical, trachea midline   Lungs:   Clear to auscultation bilaterally but diminished   Chest wall: No tenderness or deformity. Heart:  Regular rate and rhythm, S1, S2 normal, no murmur, click, rub or gallop. Abdomen:   Soft, non-tender. Bowel sounds normal.    Extremities: Extremities normal, atraumatic, no cyanosis or edema. Pulses: 2+ and symmetric all extremities. Skin: Skin color, texture, turgor normal. No rashes or lesions       Neurologic: Grossly nonfocal       Lab Results   Component Value Date/Time    Sodium 135 (L) 12/01/2021 02:50 AM    Potassium 4.3 12/01/2021 02:50 AM    Chloride 104 12/01/2021 02:50 AM    CO2 21 12/01/2021 02:50 AM    BUN 24 (H) 12/01/2021 02:50 AM    Creatinine 0.78 12/01/2021 02:50 AM    Glucose 286 (H) 12/01/2021 02:50 AM    Calcium 8.4 (L) 12/01/2021 02:50 AM       Lab Results   Component Value Date/Time    WBC 7.9 11/30/2021 02:46 AM    HGB 10.9 (L) 11/30/2021 02:46 AM    PLATELET 651 (H) 50/37/7185 02:46 AM    MCV 89.2 11/30/2021 02:46 AM       Lab Results   Component Value Date/Time    Alk.  phosphatase 51 12/01/2021 02:50 AM    Protein, total 6.8 12/01/2021 02:50 AM    Albumin 2.1 (L) 12/01/2021 02:50 AM    Globulin 4.7 (H) 12/01/2021 02:50 AM       Lab Results   Component Value Date/Time    Ferritin 1,191 (H) 12/02/2021 02:29 AM       Lab Results   Component Value Date/Time    C-Reactive protein 4.97 (H) 12/01/2021 02:50 AM        No results found for: PH, PHI, PCO2, PCO2I, PO2, PO2I, HCO3, HCO3I, FIO2, FIO2I    No results found for: CPK, RCK1, RCK2, RCK3, RCK4, CKNDX, CKND1, TROPT, TROIQ, BNPP, BNP     No results found for: CULT    No results found for: TOXA1, RPR, HBCM, HBSAG, HAAB, HCAB1, HAAT, G6PD, CRYAC, HIVGT, HIVR, HIV1, HIV12, HIVPC, HIVRPI    No results found for: VANCT, CPK    No results found for: COLOR, APPRN, SPGRU, KADE, PROTU, GLUCU, KETU, BILU, BLDU, UROU, ALMA ROSA, LEUKU, WBCU, RBCU, UEPI, BACTU, CASTS, UCRY    IMPRESSION  · COVID-19 PNA  · Acute respiratory failure with hypoxia    PLAN  · Goal sats 88% or higher, wean as tolerated; currently requiring HF at 25L  · Baricitinib  · Will transition to IV steroids  · Trend d-dimer and inflammatory markers  · Repeat CXR in the morning  · Encourage prone positioning when able  · Lovenox      Thank you for allowing us to participate in the care of this patient. We will be happy to follow along in his/her progress with you.     Keith Dunn MD

## 2021-12-02 NOTE — PROGRESS NOTES
0700 Bedside shift change report given to [address] (oncoming nurse) by Joel Leong RN (offgoing nurse). Report included the following information SBAR, Kardex, ED Summary, Intake/Output, MAR and Recent Results. This patient was assisted with Intentional Toileting every 2 hours during this shift as appropriate. Documentation of ambulation and output reflected on Flowsheet as appropriate. Purposeful hourly rounding was completed using AIDET and 5Ps. Outcomes of PHR documented as they occurred. Bed alarm in use as appropriate. Dual Suction and ambubag in place. Pt 02 sats dropping to low 80s. Put on 15 liters of midflow. Pt upgraded to stepdown. Bedside shift change report given to [address] (oncoming nurse) by Todd Raines RN (offgoing nurse). Report included the following information SBAR, Kardex, ED Summary and Recent Results.

## 2021-12-02 NOTE — PROGRESS NOTES
2975: Bedside shift change report given to [address] (oncoming nurse) by Dominga Garcia (offgoing nurse). Report included the following information SBAR, Kardex, Intake/Output, MAR, Accordion and Cardiac Rhythm NSR.

## 2021-12-03 ENCOUNTER — APPOINTMENT (OUTPATIENT)
Dept: GENERAL RADIOLOGY | Age: 51
DRG: 177 | End: 2021-12-03
Attending: INTERNAL MEDICINE
Payer: COMMERCIAL

## 2021-12-03 ENCOUNTER — APPOINTMENT (OUTPATIENT)
Dept: NON INVASIVE DIAGNOSTICS | Age: 51
DRG: 177 | End: 2021-12-03
Attending: PHYSICIAN ASSISTANT
Payer: COMMERCIAL

## 2021-12-03 LAB
ALBUMIN SERPL-MCNC: 1.9 G/DL (ref 3.5–5)
ALBUMIN/GLOB SERPL: 0.4 {RATIO} (ref 1.1–2.2)
ALP SERPL-CCNC: 52 U/L (ref 45–117)
ALT SERPL-CCNC: 38 U/L (ref 12–78)
ANION GAP SERPL CALC-SCNC: 4 MMOL/L (ref 5–15)
AST SERPL-CCNC: 16 U/L (ref 15–37)
BASOPHILS # BLD: 0 K/UL (ref 0–0.1)
BASOPHILS NFR BLD: 0 % (ref 0–1)
BILIRUB SERPL-MCNC: 0.4 MG/DL (ref 0.2–1)
BNP SERPL-MCNC: 1454 PG/ML
BUN SERPL-MCNC: 14 MG/DL (ref 6–20)
BUN/CREAT SERPL: 21 (ref 12–20)
CALCIUM SERPL-MCNC: 8.6 MG/DL (ref 8.5–10.1)
CHLORIDE SERPL-SCNC: 102 MMOL/L (ref 97–108)
CO2 SERPL-SCNC: 26 MMOL/L (ref 21–32)
CREAT SERPL-MCNC: 0.66 MG/DL (ref 0.7–1.3)
CRP SERPL-MCNC: 7.32 MG/DL (ref 0–0.6)
D DIMER PPP FEU-MCNC: 2.17 MG/L FEU (ref 0–0.65)
DIFFERENTIAL METHOD BLD: ABNORMAL
ECHO AO ASC DIAM: 3.4 CM
ECHO AV ANNULUS DIAM: 3.22 CM
ECHO AV AREA PEAK VELOCITY: 3.11 CM2
ECHO AV AREA/BSA PEAK VELOCITY: 1.3 CM2/M2
ECHO AV PEAK GRADIENT: 4.94 MMHG
ECHO AV PEAK VELOCITY: 110.41 CM/S
ECHO IVC PROX: 1.89 CM
ECHO LA AREA 4C: 14.12 CM2
ECHO LA MAJOR AXIS: 4.62 CM
ECHO LA MINOR AXIS: 1.89 CM
ECHO LA VOL 2C: 39.42 ML (ref 18–58)
ECHO LA VOL 4C: 34.47 ML (ref 18–58)
ECHO LA VOL BP: 40.67 ML (ref 18–58)
ECHO LA VOL/BSA BIPLANE: 16.6 ML/M2 (ref 16–28)
ECHO LA VOLUME INDEX A2C: 16.09 ML/M2 (ref 16–28)
ECHO LA VOLUME INDEX A4C: 14.07 ML/M2 (ref 16–28)
ECHO LV INTERNAL DIMENSION DIASTOLIC: 5.2 CM (ref 4.2–5.9)
ECHO LV INTERNAL DIMENSION SYSTOLIC: 3.52 CM
ECHO LV IVSD: 0.79 CM (ref 0.6–1)
ECHO LV MASS 2D: 144.1 G (ref 88–224)
ECHO LV MASS INDEX 2D: 58.8 G/M2 (ref 49–115)
ECHO LV POSTERIOR WALL DIASTOLIC: 0.8 CM (ref 0.6–1)
ECHO LVOT DIAM: 2.41 CM
ECHO LVOT PEAK GRADIENT: 2.25 MMHG
ECHO LVOT PEAK VELOCITY: 75.03 CM/S
ECHO RV INTERNAL DIMENSION: 3.99 CM
ECHO RV TAPSE: 2.44 CM (ref 1.5–2)
ECHO TV REGURGITANT MAX VELOCITY: 298.06 CM/S
ECHO TV REGURGITANT PEAK GRADIENT: 35.54 MMHG
EOSINOPHIL # BLD: 0 K/UL (ref 0–0.4)
EOSINOPHIL NFR BLD: 0 % (ref 0–7)
ERYTHROCYTE [DISTWIDTH] IN BLOOD BY AUTOMATED COUNT: 11.6 % (ref 11.5–14.5)
FERRITIN SERPL-MCNC: 887 NG/ML (ref 26–388)
GLOBULIN SER CALC-MCNC: 4.8 G/DL (ref 2–4)
GLUCOSE BLD STRIP.AUTO-MCNC: 291 MG/DL (ref 65–117)
GLUCOSE BLD STRIP.AUTO-MCNC: 371 MG/DL (ref 65–117)
GLUCOSE BLD STRIP.AUTO-MCNC: 386 MG/DL (ref 65–117)
GLUCOSE BLD STRIP.AUTO-MCNC: 409 MG/DL (ref 65–117)
GLUCOSE SERPL-MCNC: 304 MG/DL (ref 65–100)
HCT VFR BLD AUTO: 35.6 % (ref 36.6–50.3)
HGB BLD-MCNC: 12.4 G/DL (ref 12.1–17)
IMM GRANULOCYTES # BLD AUTO: 0 K/UL
IMM GRANULOCYTES NFR BLD AUTO: 0 %
LA VOL DISK BP: 38.13 ML (ref 18–58)
LYMPHOCYTES # BLD: 0.9 K/UL (ref 0.8–3.5)
LYMPHOCYTES NFR BLD: 14 % (ref 12–49)
MCH RBC QN AUTO: 30.5 PG (ref 26–34)
MCHC RBC AUTO-ENTMCNC: 34.8 G/DL (ref 30–36.5)
MCV RBC AUTO: 87.7 FL (ref 80–99)
METAMYELOCYTES NFR BLD MANUAL: 2 %
MONOCYTES # BLD: 0.2 K/UL (ref 0–1)
MONOCYTES NFR BLD: 4 % (ref 5–13)
MYELOCYTES NFR BLD MANUAL: 2 %
NEUTS BAND NFR BLD MANUAL: 1 % (ref 0–6)
NEUTS SEG # BLD: 4.8 K/UL (ref 1.8–8)
NEUTS SEG NFR BLD: 77 % (ref 32–75)
NRBC # BLD: 0 K/UL (ref 0–0.01)
NRBC BLD-RTO: 0 PER 100 WBC
PLATELET # BLD AUTO: 538 K/UL (ref 150–400)
PMV BLD AUTO: 9.9 FL (ref 8.9–12.9)
POTASSIUM SERPL-SCNC: 4.2 MMOL/L (ref 3.5–5.1)
PROT SERPL-MCNC: 6.7 G/DL (ref 6.4–8.2)
RBC # BLD AUTO: 4.06 M/UL (ref 4.1–5.7)
RBC MORPH BLD: ABNORMAL
SERVICE CMNT-IMP: ABNORMAL
SODIUM SERPL-SCNC: 132 MMOL/L (ref 136–145)
WBC # BLD AUTO: 6.1 K/UL (ref 4.1–11.1)
WBC MORPH BLD: ABNORMAL

## 2021-12-03 PROCEDURE — 80053 COMPREHEN METABOLIC PANEL: CPT

## 2021-12-03 PROCEDURE — 83880 ASSAY OF NATRIURETIC PEPTIDE: CPT

## 2021-12-03 PROCEDURE — 74011250637 HC RX REV CODE- 250/637: Performed by: INTERNAL MEDICINE

## 2021-12-03 PROCEDURE — 82962 GLUCOSE BLOOD TEST: CPT

## 2021-12-03 PROCEDURE — 65660000000 HC RM CCU STEPDOWN

## 2021-12-03 PROCEDURE — 85379 FIBRIN DEGRADATION QUANT: CPT

## 2021-12-03 PROCEDURE — 74011250636 HC RX REV CODE- 250/636: Performed by: STUDENT IN AN ORGANIZED HEALTH CARE EDUCATION/TRAINING PROGRAM

## 2021-12-03 PROCEDURE — 77010033711 HC HIGH FLOW OXYGEN

## 2021-12-03 PROCEDURE — 82728 ASSAY OF FERRITIN: CPT

## 2021-12-03 PROCEDURE — 74011636637 HC RX REV CODE- 636/637: Performed by: INTERNAL MEDICINE

## 2021-12-03 PROCEDURE — 36415 COLL VENOUS BLD VENIPUNCTURE: CPT

## 2021-12-03 PROCEDURE — 93306 TTE W/DOPPLER COMPLETE: CPT | Performed by: SPECIALIST

## 2021-12-03 PROCEDURE — 74011250636 HC RX REV CODE- 250/636: Performed by: INTERNAL MEDICINE

## 2021-12-03 PROCEDURE — 71045 X-RAY EXAM CHEST 1 VIEW: CPT

## 2021-12-03 PROCEDURE — 93306 TTE W/DOPPLER COMPLETE: CPT

## 2021-12-03 PROCEDURE — 86140 C-REACTIVE PROTEIN: CPT

## 2021-12-03 PROCEDURE — 74011000250 HC RX REV CODE- 250: Performed by: PHYSICIAN ASSISTANT

## 2021-12-03 PROCEDURE — 85025 COMPLETE CBC W/AUTO DIFF WBC: CPT

## 2021-12-03 RX ORDER — BUMETANIDE 0.25 MG/ML
1 INJECTION INTRAMUSCULAR; INTRAVENOUS DAILY
Status: DISCONTINUED | OUTPATIENT
Start: 2021-12-03 | End: 2021-12-06 | Stop reason: HOSPADM

## 2021-12-03 RX ADMIN — INSULIN LISPRO 10 UNITS: 100 INJECTION, SOLUTION INTRAVENOUS; SUBCUTANEOUS at 12:02

## 2021-12-03 RX ADMIN — TICAGRELOR 60 MG: 60 TABLET ORAL at 09:01

## 2021-12-03 RX ADMIN — LOSARTAN POTASSIUM 50 MG: 50 TABLET, FILM COATED ORAL at 09:00

## 2021-12-03 RX ADMIN — Medication 10 ML: at 21:07

## 2021-12-03 RX ADMIN — INSULIN LISPRO 10 UNITS: 100 INJECTION, SOLUTION INTRAVENOUS; SUBCUTANEOUS at 09:02

## 2021-12-03 RX ADMIN — ATORVASTATIN CALCIUM 80 MG: 20 TABLET, FILM COATED ORAL at 09:00

## 2021-12-03 RX ADMIN — INSULIN LISPRO 5 UNITS: 100 INJECTION, SOLUTION INTRAVENOUS; SUBCUTANEOUS at 09:02

## 2021-12-03 RX ADMIN — BUMETANIDE 1 MG: 0.25 INJECTION INTRAMUSCULAR; INTRAVENOUS at 12:03

## 2021-12-03 RX ADMIN — SPIRONOLACTONE 25 MG: 25 TABLET ORAL at 09:00

## 2021-12-03 RX ADMIN — HYDRALAZINE HYDROCHLORIDE 20 MG: 20 INJECTION INTRAMUSCULAR; INTRAVENOUS at 00:23

## 2021-12-03 RX ADMIN — INSULIN LISPRO 6 UNITS: 100 INJECTION, SOLUTION INTRAVENOUS; SUBCUTANEOUS at 12:02

## 2021-12-03 RX ADMIN — METOPROLOL TARTRATE 50 MG: 50 TABLET, FILM COATED ORAL at 09:00

## 2021-12-03 RX ADMIN — ENOXAPARIN SODIUM 40 MG: 100 INJECTION SUBCUTANEOUS at 09:01

## 2021-12-03 RX ADMIN — DEXAMETHASONE SODIUM PHOSPHATE 10 MG: 10 INJECTION, SOLUTION INTRAMUSCULAR; INTRAVENOUS at 09:02

## 2021-12-03 RX ADMIN — BARICITINIB 4 MG: 2 TABLET, FILM COATED ORAL at 09:01

## 2021-12-03 RX ADMIN — DEXAMETHASONE SODIUM PHOSPHATE 10 MG: 10 INJECTION, SOLUTION INTRAMUSCULAR; INTRAVENOUS at 21:07

## 2021-12-03 RX ADMIN — INSULIN LISPRO 10 UNITS: 100 INJECTION, SOLUTION INTRAVENOUS; SUBCUTANEOUS at 21:07

## 2021-12-03 RX ADMIN — ASPIRIN 81 MG: 81 TABLET, COATED ORAL at 09:00

## 2021-12-03 RX ADMIN — INSULIN HUMAN 30 UNITS: 100 INJECTION, SUSPENSION SUBCUTANEOUS at 17:12

## 2021-12-03 RX ADMIN — METOPROLOL TARTRATE 50 MG: 50 TABLET, FILM COATED ORAL at 17:12

## 2021-12-03 RX ADMIN — TICAGRELOR 60 MG: 60 TABLET ORAL at 17:12

## 2021-12-03 RX ADMIN — INSULIN LISPRO 10 UNITS: 100 INJECTION, SOLUTION INTRAVENOUS; SUBCUTANEOUS at 17:13

## 2021-12-03 RX ADMIN — GLIPIZIDE 5 MG: 5 TABLET ORAL at 09:00

## 2021-12-03 RX ADMIN — Medication 10 ML: at 12:06

## 2021-12-03 RX ADMIN — Medication 10 ML: at 05:15

## 2021-12-03 RX ADMIN — INSULIN HUMAN 30 UNITS: 100 INJECTION, SUSPENSION SUBCUTANEOUS at 09:01

## 2021-12-03 RX ADMIN — GLIPIZIDE 5 MG: 5 TABLET ORAL at 17:12

## 2021-12-03 NOTE — PROGRESS NOTES
Comprehensive Nutrition Assessment      Type and Reason for Visit: Initial, RD nutrition re-screen/LOS    Nutrition Recommendations/Plan:   1. Continue 4 carb choice diet  2. Add Glucerna TID for ease of protein/kcal intake  3. Encourage PO as able. 4. Adjust bowel regimen. - No BM since admission. Nutrition Assessment:       Pt admitted for Pneumonia due to COVID-19 virus [U07.1, J12.82]  COVID-19 virus infection [U07.1]  Acute respiratory failure with hypoxia ([facility] [address] [J96.01]. Pt  has no past medical history on file. .    Pt screened for LOS. Pt on droplet plus precautions. Pt did not answer phone. Adjusted diet 12/2. PO did not eat well today. Ate well on 12/1. Currently on 20 L which is slightly lower than previously, was on 30 L this morning. Skin intact. No reported n/v. Will continue to monitor. Added Glucerna for ease of intake if PO continues to be poor. NKFA. No reported hx of chew/swallow difficulties. Wt Readings from Last 10 Encounters:   12/03/21 118.1 kg (260 lb 5.8 oz)       Malnutrition Assessment:  Malnutrition Status: At risk for malnutrition (specify)    Context:  Acute illness     Findings of the 6 clinical characteristics of malnutrition:   Energy Intake:  Mild decrease in energy intake (specify)  Weight Loss:  No significant weight loss     Body Fat Loss:  Unable to assess,     Muscle Mass Loss:  Unable to assess,    Fluid Accumulation:  No significant fluid accumulation,     Strength:  Not performed         Estimated Daily Nutrient Needs:  Energy (kcal): 2763; Weight Used for Energy Requirements: Current  Protein (g): 120-142 (1.0-1.2g/kg);  Weight Used for Protein Requirements: Current  Fluid (ml/day): 2750; Method Used for Fluid Requirements: 1 ml/kcal    Documented meal intake:   Patient Vitals for the past 168 hrs:   % Diet Eaten   12/02/21 0700 0%   12/01/21 1530 76 - 100%       Documented Supplement intake:  Patient Vitals for the past 168 hrs:   Supplement intake % 12/02/21 0700 0%   12/01/21 1530 0%       Nutrition Related Findings:     Last BM: PTA   Edema: none documented      Nutritionally Significant Medications:   Lipitor, Olumiant, Bumex, Decadron, Lovenox, Humalog, Novolin. Wounds:    None       Current Nutrition Therapies:  ADULT DIET Regular; 4 carb choices (60 gm/meal)    Anthropometric Measures:  · Height:  6' 3\" (190.5 cm)  · Current Body Wt:  118.1 kg (260 lb 5.8 oz)   · Admission Body Wt:   260 lbs    · Usual Body Wt:   ?     · Ideal Body Wt:  196 lbs:  132.8 %   · BMI Category:  Obese class 1 (BMI 30.0-34. 9)       Nutrition Diagnosis:   · Inadequate protein-energy intake related to catabolic illness as evidenced by  (COVID-19)      Nutrition Interventions:   Food and/or Nutrient Delivery: Continue current diet, Start oral nutrition supplement  Nutrition Education and Counseling: No recommendations at this time  Coordination of Nutrition Care: Continue to monitor while inpatient, Interdisciplinary rounds    Goals:  PO>50%of meals and 1-2 ONS per day within 3-5 days       Nutrition Monitoring and Evaluation:   Behavioral-Environmental Outcomes: None identified  Food/Nutrient Intake Outcomes: Food and nutrient intake, Supplement intake  Physical Signs/Symptoms Outcomes: Biochemical data, Weight    Discharge Planning:     Too soon to determine     Electronically signed by Halina Don, [address]     Contact: [phone]

## 2021-12-03 NOTE — PROGRESS NOTES
Name: [facility]: [address]   : 1970 Admit Date: 2021   Phone: [phone]  Room: [facility]/01   PCP: Horacio Shepherd MD  MRN: [id number]   Date: 12/3/2021  Code: Full Code          Chart and notes reviewed. Data reviewed. I review the patient's current medications in the medical record at each encounter. I have evaluated and examined the patient. HPI:    4:49 PM       History was obtained from patient. I was asked by Courtney Cornell DO to see Kristan Gutierrez in consultation for a chief complaint of COVID-19 and acute respiratory failure with hypoxia. History of Present Illness:  Mr. Carmen Isaac is a 53yo gentleman with a history of CAD s/p PIC, DM, and obesity who is admitted with COVID-19 and acute respiratory failure with hypoxia. He was admitted on  and had described symptoms that had begun about a week prior. He had dirrhea initially that had resolved, but had progressive shortness of breath on exertion that prompted admission. He has had a progressively increasing O2 requirement and is currently on HF at 25L. He is received baricitinib and steroids. He is frustrated that he was not initially treated with HF NC despite not needing this until more recently. Labs: WBC 7.9, HGb 10.9, , cr 0.78, CRP 4.97, d-dimer 2.15    Images reviewed:  CXR 2021: faint opacities on the right    Interval hx:  Afebrile   On HFNC 30L, 100% with sats 91%    ddimer 2.17  crp 7.32--up    CXR today: Increased bilateral peripheral airspace opacities.  New small left pleural effusion    ROS: frustrated with being in the hospital. Says he feels fine      Social History     Tobacco Use    Smoking status: Not on file    Smokeless tobacco: Not on file   Substance Use Topics    Alcohol use: Not on file       No Known Allergies    Current Facility-Administered Medications   Medication Dose Route Frequency    insulin lispro (HUMALOG) injection 10 Units  10 Units SubCUTAneous TIDAC    dexamethasone (PF) (DECADRON) 10 mg/mL injection 10 mg  10 mg IntraVENous Q12H    losartan (COZAAR) tablet 50 mg  50 mg Oral DAILY    hydrALAZINE (APRESOLINE) 20 mg/mL injection 20 mg  20 mg IntraVENous Q6H PRN    insulin NPH (NOVOLIN N, HUMULIN N) injection 30 Units  30 Units SubCUTAneous ACB&D    insulin lispro (HUMALOG) injection   SubCUTAneous AC&HS    glucose chewable tablet 16 g  4 Tablet Oral PRN    dextrose (D50W) injection syrg 12.5-25 g  12.5-25 g IntraVENous PRN    glucagon (GLUCAGEN) injection 1 mg  1 mg IntraMUSCular PRN    aspirin delayed-release tablet 81 mg  81 mg Oral DAILY    atorvastatin (LIPITOR) tablet 80 mg  80 mg Oral DAILY    glipiZIDE (GLUCOTROL) tablet 5 mg  5 mg Oral BID    metoprolol tartrate (LOPRESSOR) tablet 50 mg  50 mg Oral BID    spironolactone (ALDACTONE) tablet 25 mg  25 mg Oral DAILY    ticagrelor (BRILINTA) tablet 60 mg  60 mg Oral BID    baricitinib (OLUMIANT) tablet 4 mg  4 mg Oral DAILY    sodium chloride (NS) flush 5-40 mL  5-40 mL IntraVENous Q8H    sodium chloride (NS) flush 5-40 mL  5-40 mL IntraVENous PRN    acetaminophen (TYLENOL) tablet 650 mg  650 mg Oral Q6H PRN    Or    acetaminophen (TYLENOL) suppository 650 mg  650 mg Rectal Q6H PRN    polyethylene glycol (MIRALAX) packet 17 g  17 g Oral DAILY PRN    ondansetron (ZOFRAN ODT) tablet 4 mg  4 mg Oral Q8H PRN    Or    ondansetron (ZOFRAN) injection 4 mg  4 mg IntraVENous Q6H PRN    enoxaparin (LOVENOX) injection 40 mg  40 mg SubCUTAneous DAILY         REVIEW OF SYSTEMS   12 point ROS negative except as stated in the HPI. Physical Exam:   Visit Vitals  /78 (BP 1 Location: Left upper arm, BP Patient Position: At rest)   Pulse 78   Temp 98.3 °F (36.8 °C)   Resp 17   Ht 6' 3\" (1.905 m)   Wt 118.1 kg (260 lb 5.8 oz)   SpO2 90%   BMI 32.54 kg/m²       General:  Alert, cooperative, no distress, appears stated age.    Head:  Normocephalic, without obvious abnormality, atraumatic. Eyes:  Conjunctivae/corneas clear. Nose: Nares normal. Septum midline. Mucosa normal.    Throat: Lips, mucosa, and tongue normal.    Neck: Supple, symmetrical, trachea midline   Lungs:   Clear to auscultation bilaterally but diminished   Chest wall:  No tenderness or deformity. Heart:  Regular rate and rhythm, S1, S2 normal, no murmur, click, rub or gallop. Abdomen:   Soft, non-tender. Bowel sounds normal.    Extremities: Extremities normal, atraumatic, no cyanosis or edema. Pulses: 2+ and symmetric all extremities. Skin: Skin color, texture, turgor normal. No rashes or lesions       Neurologic: Grossly nonfocal       Lab Results   Component Value Date/Time    Sodium 132 (L) 12/03/2021 05:13 AM    Potassium 4.2 12/03/2021 05:13 AM    Chloride 102 12/03/2021 05:13 AM    CO2 26 12/03/2021 05:13 AM    BUN 14 12/03/2021 05:13 AM    Creatinine 0.66 (L) 12/03/2021 05:13 AM    Glucose 304 (H) 12/03/2021 05:13 AM    Calcium 8.6 12/03/2021 05:13 AM       Lab Results   Component Value Date/Time    WBC 6.1 12/03/2021 05:13 AM    HGB 12.4 12/03/2021 05:13 AM    PLATELET 824 (H) 64/88/1034 05:13 AM    MCV 87.7 12/03/2021 05:13 AM       Lab Results   Component Value Date/Time    Alk.  phosphatase 52 12/03/2021 05:13 AM    Protein, total 6.7 12/03/2021 05:13 AM    Albumin 1.9 (L) 12/03/2021 05:13 AM    Globulin 4.8 (H) 12/03/2021 05:13 AM       Lab Results   Component Value Date/Time    Ferritin 887 (H) 12/03/2021 05:13 AM       Lab Results   Component Value Date/Time    C-Reactive protein 7.32 (H) 12/03/2021 05:13 AM        No results found for: PH, PHI, PCO2, PCO2I, PO2, PO2I, HCO3, HCO3I, FIO2, FIO2I    No results found for: CPK, RCK1, RCK2, RCK3, RCK4, CKNDX, CKND1, TROPT, TROIQ, BNPP, BNP     No results found for: CULT    No results found for: TOXA1, RPR, HBCM, HBSAG, HAAB, HCAB1, HAAT, G6PD, CRYAC, HIVGT, HIVR, HIV1, HIV12, HIVPC, HIVRPI    No results found for: VANCT, CPK    No results found for: COLOR, APPRN, SPGRU, KADE, PROTU, GLUCU, KETU, BILU, BLDU, UROU, ALMA ROSA, LEUKU, WBCU, RBCU, UEPI, BACTU, CASTS, UCRY    IMPRESSION  · COVID-19 PNA  · Acute respiratory failure with hypoxia    PLAN  · Goal sats 88% or higher, wean as tolerated; currently requiring HF at 30L  · Baricitinib  · Dex IV 10mg bid  · Checked BNP-->1454  · Start Bumex 1mg IV daily  · Echo ordered  · Trend d-dimer and inflammatory markers  · Encourage prone positioning when able  · Lovenox    Patient is critically ill and at high risk for further decompensation. Cc time 32min    Will see prn over the weekend     Milena Lowery.  Saima Arguelles

## 2021-12-03 NOTE — PROGRESS NOTES
1915 Bedside and Verbal shift change report given to SUKHWINDER Wong (oncoming nurse) by SUKHWINDER Campbell (offgoing nurse). Report included the following information SBAR, Kardex, Intake/Output, MAR, Recent Results and Cardiac Rhythm NSR. This patient was assisted with Intentional Toileting every 2 hours during this shift as appropriate. Documentation of ambulation and output reflected on Flowsheet as appropriate. Purposeful hourly rounding was completed using AIDET and 5Ps. Outcomes of PHR documented as they occurred. Bed alarm in use as appropriate. Dual Suction and ambubag in place. 0715 Bedside and Verbal shift change report given to SUKHWINDER Campbell (oncoming nurse) by Kori Jeffers RN (offgoing nurse). Report included the following information SBAR, Kardex, Intake/Output, MAR, Recent Results and Cardiac Rhythm NSR.

## 2021-12-03 NOTE — PROGRESS NOTES
Eleazar Pinzon [facility] [place] [address]  [address] [facility], [place], [address] [place] [address]  [phone]      Medical Progress Note      NAME: Pro Campbell   :  1970  MRM:  [id number]    Date of service: 12/3/2021  7:05 AM       Assessment and Plan:   1. Acute hypoxic respiratory failure due to COVID 19. Worsening. Now on high flow O2. Continue supplement O2 to keep SAO2 > 90%. Started on bumex IV. Pulmonary evaluation appreciated      2. Covid 19 pneumonia. Continue decadron(increased to BID ), baricitinib; trend inflammatory markers. Encourage prone position. 3. DM2 with hyperglycemia - poorly controlled. A1C: 11.7. SSI. Worsened BG due to steroid. Started on NPH and lispro with meals. Continue SSI      4. Hyponatremia. Improving. continue IVF and monitor     5. CAD s/p stents - on lipitor, brilinta, asa     6. HTN. Not well controlled. Increased losartan to 50 mg, continue metoprolol and spironolactone. 7.  Obesity - BMI 32.54. would benefit from weight loss    Pt is from [place], here visiting. Asking to be discharged despite on 4L of O2. I'm not sure if there is a concentrator to get him to [place]. Subjective:     Chief Complaint[de-identified] Patient was seen and examined as a follow up for COVID. Chart was reviewed. c/o cough and SOB    ROS:  (bold if positive, if negative)    Tolerating PT  Tolerating Diet        Objective:     Last 24hrs VS reviewed since prior progress note.  Most recent are:    Visit Vitals  /62 (BP 1 Location: Left upper arm, BP Patient Position: At rest)   Pulse 75   Temp 98.6 °F (37 °C)   Resp 18   Ht 6' 3\" (1.905 m)   Wt 118.1 kg (260 lb 5.8 oz)   SpO2 93%   BMI 32.54 kg/m²     SpO2 Readings from Last 6 Encounters:   21 93%    O2 Flow Rate (L/min): 30 l/min       Intake/Output Summary (Last 24 hours) at 12/3/2021 1138  Last data filed at 12/3/2021 1110  Gross per 24 hour   Intake 360 ml   Output 2600 ml   Net -2240 ml        Physical Exam:    Gen: Well-developed, well-nourished, in no acute distress  HEENT:  Pink conjunctivae, PERRL, hearing intact to voice, moist mucous membranes  Neck:  Supple, without masses, thyroid non-tender  Resp:  No accessory muscle use, clear breath sounds without wheezes rales or rhonchi  Card:  No murmurs, normal S1, S2 without thrills, bruits or peripheral edema  Abd:  Soft, non-tender, non-distended, normoactive bowel sounds are present, no palpable organomegaly and no detectable hernias  Lymph:  No cervical or inguinal adenopathy  Musc:  No cyanosis or clubbing  Skin:  No rashes or ulcers, skin turgor is good  Neuro:  Cranial nerves are grossly intact, no focal motor weakness, follows commands appropriately  Psych:  Good insight, oriented to person, place and time, alert  __________________________________________________________________  Medications Reviewed: (see below)  Medications:     Current Facility-Administered Medications   Medication Dose Route Frequency    bumetanide (BUMEX) injection 1 mg  1 mg IntraVENous DAILY    insulin lispro (HUMALOG) injection 10 Units  10 Units SubCUTAneous TIDAC    dexamethasone (PF) (DECADRON) 10 mg/mL injection 10 mg  10 mg IntraVENous Q12H    losartan (COZAAR) tablet 50 mg  50 mg Oral DAILY    hydrALAZINE (APRESOLINE) 20 mg/mL injection 20 mg  20 mg IntraVENous Q6H PRN    insulin NPH (NOVOLIN N, HUMULIN N) injection 30 Units  30 Units SubCUTAneous ACB&D    insulin lispro (HUMALOG) injection   SubCUTAneous AC&HS    glucose chewable tablet 16 g  4 Tablet Oral PRN    dextrose (D50W) injection syrg 12.5-25 g  12.5-25 g IntraVENous PRN    glucagon (GLUCAGEN) injection 1 mg  1 mg IntraMUSCular PRN    aspirin delayed-release tablet 81 mg  81 mg Oral DAILY    atorvastatin (LIPITOR) tablet 80 mg  80 mg Oral DAILY    glipiZIDE (GLUCOTROL) tablet 5 mg  5 mg Oral BID    metoprolol tartrate (LOPRESSOR) tablet 50 mg  50 mg Oral BID    spironolactone (ALDACTONE) tablet 25 mg  25 mg Oral DAILY    ticagrelor (BRILINTA) tablet 60 mg  60 mg Oral BID    baricitinib (OLUMIANT) tablet 4 mg  4 mg Oral DAILY    sodium chloride (NS) flush 5-40 mL  5-40 mL IntraVENous Q8H    sodium chloride (NS) flush 5-40 mL  5-40 mL IntraVENous PRN    acetaminophen (TYLENOL) tablet 650 mg  650 mg Oral Q6H PRN    Or    acetaminophen (TYLENOL) suppository 650 mg  650 mg Rectal Q6H PRN    polyethylene glycol (MIRALAX) packet 17 g  17 g Oral DAILY PRN    ondansetron (ZOFRAN ODT) tablet 4 mg  4 mg Oral Q8H PRN    Or    ondansetron (ZOFRAN) injection 4 mg  4 mg IntraVENous Q6H PRN    enoxaparin (LOVENOX) injection 40 mg  40 mg SubCUTAneous DAILY        Lab Data Reviewed: (see below)  Lab Review:     Recent Labs     12/03/21 0513   WBC 6.1   HGB 12.4   HCT 35.6*   *     Recent Labs     12/03/21  0513 12/01/21  0250   * 135*   K 4.2 4.3    104   CO2 26 21   * 286*   BUN 14 24*   CREA 0.66* 0.78   CA 8.6 8.4*   ALB 1.9* 2.1*   TBILI 0.4 0.3   ALT 38 69     Lab Results   Component Value Date/Time    Glucose (POC) 386 (H) 12/03/2021 11:07 AM    Glucose (POC) 291 (H) 12/03/2021 08:42 AM    Glucose (POC) 324 (H) 12/02/2021 08:49 PM    Glucose (POC) 277 (H) 12/02/2021 04:07 PM    Glucose (POC) 152 (H) 12/02/2021 11:15 AM     No results for input(s): PH, PCO2, PO2, HCO3, FIO2 in the last 72 hours. No results for input(s): INR, INREXT, INREXT in the last 72 hours. All Micro Results     Procedure Component Value Units Date/Time    COVID-19 RAPID TEST [[id number]]  (Abnormal) Collected: 11/28/21 1245    Order Status: Completed Specimen: Nasopharyngeal Updated: 11/28/21 1323     Specimen source Nasopharyngeal        COVID-19 rapid test Detected        Comment: CALLED TO AND READ BACK BY  SUKHWINDER HALL,1320,DK  Rapid Abbott ID Now       The specimen is POSITIVE for SARS-CoV-2, the novel coronavirus associated with COVID-19.        This test has been authorized by the FDA under an Emergency Use Authorization (EUA) for use by authorized laboratories. Fact sheet for Healthcare Providers: ConventionUpdate.co.nz  Fact sheet for Patients: ConventionUpdate.co.nz       Methodology: Isothermal Nucleic Acid Amplification               I have reviewed notes of prior 24hr. Other pertinent lab: Total time spent with patient: 28 I personally reviewed chart, notes, data and current medications in the medical record. I have personally examined and treated the patient at bedside during this period.                  Care Plan discussed with: Patient, Nursing Staff and >50% of time spent in counseling and coordination of care    Discussed:  Care Plan    Prophylaxis:  Lovenox    Disposition:  Home w/Family           ___________________________________________________    Attending Physician: Gaby Giraldo MD

## 2021-12-03 NOTE — PROGRESS NOTES
0700: Bedside shift change report given to [address] [place] [address] (oncoming nurse) by Ayesha Huerta RN (offgoing nurse). Report included the following information SBAR, Kardex, Intake/Output, MAR, Accordion and Cardiac Rhythm NSR. This patient was assisted with Intentional Toileting every 2 hours during this shift as appropriate. Documentation of ambulation and output reflected on Flowsheet as appropriate. Purposeful hourly rounding was completed using AIDET and 5Ps. Outcomes of PHR documented as they occurred. Bed alarm in use as appropriate. Dual Suction and ambubag in place.

## 2021-12-03 NOTE — PROGRESS NOTES
Care Management follow up, LOS 5 days     Patient admitted for acute hypoxic respiratory failure, COVID19+, pneumonia. A1C = 11.7. History of: diabetes, CAD with stents, HTN. COVID Vaccine:  yes  type: Pfizer    date completed April, 2021     RUR 6 (Score %) low    Is This a Readmission NO  Is this a Bundle NO     Current status  Patient discussed during interdisciplinary rounds. Patient continues to require medical management for COVID19. Currently on high flow oxygen at 30L/90%, continues on Bumex IV, Decadron IV. Patient is from [place] and is here visiting family. Barrier:  If patient needs home oxygen he would need portable oxygen for drive back to [place].     Transition of Care Plan  · Monitor patient status and response to treatment. · Patient continues to require medical management. · Home with family assist at DC, independent prior to admission. · Will monitor for home oxygen needs.   · CM to monitor progress and recommendations.     Ivanna Narayanan, RN, MSN/Care manager

## 2021-12-04 LAB
ALBUMIN SERPL-MCNC: 2 G/DL (ref 3.5–5)
ALBUMIN/GLOB SERPL: 0.5 {RATIO} (ref 1.1–2.2)
ALP SERPL-CCNC: 49 U/L (ref 45–117)
ALT SERPL-CCNC: 35 U/L (ref 12–78)
ANION GAP SERPL CALC-SCNC: 8 MMOL/L (ref 5–15)
AST SERPL-CCNC: 13 U/L (ref 15–37)
BASOPHILS # BLD: 0 K/UL (ref 0–0.1)
BASOPHILS NFR BLD: 0 % (ref 0–1)
BILIRUB SERPL-MCNC: 0.3 MG/DL (ref 0.2–1)
BUN SERPL-MCNC: 22 MG/DL (ref 6–20)
BUN/CREAT SERPL: 31 (ref 12–20)
CALCIUM SERPL-MCNC: 8.7 MG/DL (ref 8.5–10.1)
CHLORIDE SERPL-SCNC: 100 MMOL/L (ref 97–108)
CO2 SERPL-SCNC: 26 MMOL/L (ref 21–32)
CREAT SERPL-MCNC: 0.72 MG/DL (ref 0.7–1.3)
CRP SERPL-MCNC: 2.89 MG/DL (ref 0–0.6)
DIFFERENTIAL METHOD BLD: ABNORMAL
EOSINOPHIL # BLD: 0 K/UL (ref 0–0.4)
EOSINOPHIL NFR BLD: 0 % (ref 0–7)
ERYTHROCYTE [DISTWIDTH] IN BLOOD BY AUTOMATED COUNT: 11.7 % (ref 11.5–14.5)
GLOBULIN SER CALC-MCNC: 3.9 G/DL (ref 2–4)
GLUCOSE BLD STRIP.AUTO-MCNC: 316 MG/DL (ref 65–117)
GLUCOSE BLD STRIP.AUTO-MCNC: 344 MG/DL (ref 65–117)
GLUCOSE BLD STRIP.AUTO-MCNC: 355 MG/DL (ref 65–117)
GLUCOSE BLD STRIP.AUTO-MCNC: 376 MG/DL (ref 65–117)
GLUCOSE SERPL-MCNC: 313 MG/DL (ref 65–100)
HCT VFR BLD AUTO: 35.2 % (ref 36.6–50.3)
HGB BLD-MCNC: 12.4 G/DL (ref 12.1–17)
IMM GRANULOCYTES # BLD AUTO: 0 K/UL
IMM GRANULOCYTES NFR BLD AUTO: 0 %
LYMPHOCYTES # BLD: 0.5 K/UL (ref 0.8–3.5)
LYMPHOCYTES NFR BLD: 5 % (ref 12–49)
MCH RBC QN AUTO: 30.8 PG (ref 26–34)
MCHC RBC AUTO-ENTMCNC: 35.2 G/DL (ref 30–36.5)
MCV RBC AUTO: 87.3 FL (ref 80–99)
METAMYELOCYTES NFR BLD MANUAL: 3 %
MONOCYTES # BLD: 0.2 K/UL (ref 0–1)
MONOCYTES NFR BLD: 2 % (ref 5–13)
NEUTS BAND NFR BLD MANUAL: 1 % (ref 0–6)
NEUTS SEG # BLD: 9.8 K/UL (ref 1.8–8)
NEUTS SEG NFR BLD: 89 % (ref 32–75)
NRBC # BLD: 0 K/UL (ref 0–0.01)
NRBC BLD-RTO: 0 PER 100 WBC
PLATELET # BLD AUTO: 588 K/UL (ref 150–400)
PMV BLD AUTO: 9.8 FL (ref 8.9–12.9)
POTASSIUM SERPL-SCNC: 5.1 MMOL/L (ref 3.5–5.1)
PROT SERPL-MCNC: 5.9 G/DL (ref 6.4–8.2)
RBC # BLD AUTO: 4.03 M/UL (ref 4.1–5.7)
RBC MORPH BLD: ABNORMAL
SERVICE CMNT-IMP: ABNORMAL
SODIUM SERPL-SCNC: 134 MMOL/L (ref 136–145)
WBC # BLD AUTO: 10.9 K/UL (ref 4.1–11.1)

## 2021-12-04 PROCEDURE — 77010033678 HC OXYGEN DAILY

## 2021-12-04 PROCEDURE — 80053 COMPREHEN METABOLIC PANEL: CPT

## 2021-12-04 PROCEDURE — 65660000000 HC RM CCU STEPDOWN

## 2021-12-04 PROCEDURE — 74011250637 HC RX REV CODE- 250/637: Performed by: INTERNAL MEDICINE

## 2021-12-04 PROCEDURE — 85025 COMPLETE CBC W/AUTO DIFF WBC: CPT

## 2021-12-04 PROCEDURE — 74011000250 HC RX REV CODE- 250: Performed by: PHYSICIAN ASSISTANT

## 2021-12-04 PROCEDURE — 74011250636 HC RX REV CODE- 250/636: Performed by: INTERNAL MEDICINE

## 2021-12-04 PROCEDURE — 74011636637 HC RX REV CODE- 636/637: Performed by: INTERNAL MEDICINE

## 2021-12-04 PROCEDURE — 36415 COLL VENOUS BLD VENIPUNCTURE: CPT

## 2021-12-04 PROCEDURE — 82728 ASSAY OF FERRITIN: CPT

## 2021-12-04 PROCEDURE — 77010033711 HC HIGH FLOW OXYGEN

## 2021-12-04 PROCEDURE — 82962 GLUCOSE BLOOD TEST: CPT

## 2021-12-04 PROCEDURE — 86140 C-REACTIVE PROTEIN: CPT

## 2021-12-04 RX ORDER — INSULIN LISPRO 100 [IU]/ML
12 INJECTION, SOLUTION INTRAVENOUS; SUBCUTANEOUS
Status: DISCONTINUED | OUTPATIENT
Start: 2021-12-04 | End: 2021-12-06 | Stop reason: HOSPADM

## 2021-12-04 RX ADMIN — DEXAMETHASONE SODIUM PHOSPHATE 10 MG: 10 INJECTION, SOLUTION INTRAMUSCULAR; INTRAVENOUS at 20:30

## 2021-12-04 RX ADMIN — SPIRONOLACTONE 25 MG: 25 TABLET ORAL at 08:59

## 2021-12-04 RX ADMIN — BARICITINIB 4 MG: 2 TABLET, FILM COATED ORAL at 09:00

## 2021-12-04 RX ADMIN — ENOXAPARIN SODIUM 40 MG: 100 INJECTION SUBCUTANEOUS at 09:01

## 2021-12-04 RX ADMIN — Medication 10 ML: at 17:22

## 2021-12-04 RX ADMIN — Medication 10 ML: at 20:30

## 2021-12-04 RX ADMIN — ATORVASTATIN CALCIUM 80 MG: 20 TABLET, FILM COATED ORAL at 09:00

## 2021-12-04 RX ADMIN — TICAGRELOR 60 MG: 60 TABLET ORAL at 17:20

## 2021-12-04 RX ADMIN — Medication 10 ML: at 06:19

## 2021-12-04 RX ADMIN — LOSARTAN POTASSIUM 50 MG: 50 TABLET, FILM COATED ORAL at 09:00

## 2021-12-04 RX ADMIN — METOPROLOL TARTRATE 50 MG: 50 TABLET, FILM COATED ORAL at 09:00

## 2021-12-04 RX ADMIN — ASPIRIN 81 MG: 81 TABLET, COATED ORAL at 08:59

## 2021-12-04 RX ADMIN — TICAGRELOR 60 MG: 60 TABLET ORAL at 09:03

## 2021-12-04 RX ADMIN — INSULIN HUMAN 30 UNITS: 100 INJECTION, SUSPENSION SUBCUTANEOUS at 17:20

## 2021-12-04 RX ADMIN — BUMETANIDE 1 MG: 0.25 INJECTION INTRAMUSCULAR; INTRAVENOUS at 09:01

## 2021-12-04 RX ADMIN — INSULIN LISPRO 7 UNITS: 100 INJECTION, SOLUTION INTRAVENOUS; SUBCUTANEOUS at 09:01

## 2021-12-04 RX ADMIN — INSULIN LISPRO 10 UNITS: 100 INJECTION, SOLUTION INTRAVENOUS; SUBCUTANEOUS at 09:02

## 2021-12-04 RX ADMIN — INSULIN HUMAN 30 UNITS: 100 INJECTION, SUSPENSION SUBCUTANEOUS at 09:02

## 2021-12-04 RX ADMIN — GLIPIZIDE 5 MG: 5 TABLET ORAL at 17:20

## 2021-12-04 RX ADMIN — INSULIN LISPRO 10 UNITS: 100 INJECTION, SOLUTION INTRAVENOUS; SUBCUTANEOUS at 12:07

## 2021-12-04 RX ADMIN — INSULIN LISPRO 6 UNITS: 100 INJECTION, SOLUTION INTRAVENOUS; SUBCUTANEOUS at 17:21

## 2021-12-04 RX ADMIN — DEXAMETHASONE SODIUM PHOSPHATE 10 MG: 10 INJECTION, SOLUTION INTRAMUSCULAR; INTRAVENOUS at 09:01

## 2021-12-04 RX ADMIN — INSULIN LISPRO 8 UNITS: 100 INJECTION, SOLUTION INTRAVENOUS; SUBCUTANEOUS at 12:07

## 2021-12-04 RX ADMIN — INSULIN LISPRO 12 UNITS: 100 INJECTION, SOLUTION INTRAVENOUS; SUBCUTANEOUS at 17:20

## 2021-12-04 RX ADMIN — GLIPIZIDE 5 MG: 5 TABLET ORAL at 08:59

## 2021-12-04 RX ADMIN — INSULIN LISPRO 4 UNITS: 100 INJECTION, SOLUTION INTRAVENOUS; SUBCUTANEOUS at 21:58

## 2021-12-04 NOTE — PROGRESS NOTES
Resting comfortably this shift. Semi-proning, VSS. Denies any resp distress. O2 Sat 93-97% with HF O@ 20L/ 88% fio2.

## 2021-12-04 NOTE — PROGRESS NOTES
Problem: Risk for Spread of Infection  Goal: Prevent transmission of infectious organism to others  Description: Prevent the transmission of infectious organisms to other patients, staff members, and visitors.   Outcome: Progressing Towards Goal     Problem: Patient Education:  Go to Education Activity  Goal: Patient/Family Education  Outcome: Progressing Towards Goal     Problem: Airway Clearance - Ineffective  Goal: Achieve or maintain patent airway  Outcome: Progressing Towards Goal     Problem: Gas Exchange - Impaired  Goal: Absence of hypoxia  Outcome: Progressing Towards Goal  Goal: Promote optimal lung function  Outcome: Progressing Towards Goal     Problem: Breathing Pattern - Ineffective  Goal: Ability to achieve and maintain a regular respiratory rate  Outcome: Progressing Towards Goal

## 2021-12-04 NOTE — PROGRESS NOTES
Eleazar [facility]  [address] [facility], [address] [place] [address]  [phone]      Medical Progress Note      NAME: Padmaja Park   :  1970  MRM:  [id number]    Date of service: 2021  7:05 AM       Assessment and Plan:   1. Acute hypoxic respiratory failure due to COVID 19. Worsening. Now on high flow O2. Continue supplement O2 to keep SAO2 > 90%. Started on bumex IV. Pulmonary evaluation appreciated      2. Covid 19 pneumonia. Continue decadron(increased to BID ), baricitinib; trend inflammatory markers. Encourage prone position. Recent CXR: Increased bilateral peripheral airspace opacities    3. DM2 with hyperglycemia - poorly controlled. A1C: 11.7. SSI. Worsened BG due to steroid. Started on NPH and lispro with meals. Continue SSI      4. Hyponatremia. Improving. continue IVF and monitor     5. CAD s/p stents - on lipitor, brilinta, asa     6. HTN. Not well controlled. Increased losartan to 50 mg, continue metoprolol and spironolactone. 7.  Obesity - BMI 32.54. would benefit from weight loss    Pt is from [facility], here visiting. Asking to be discharged despite on 4L of O2. I'm not sure if there is a concentrator to get him to [facility]. Subjective:     Chief Complaint[de-identified] Patient was seen and examined as a follow up for COVID. Chart was reviewed. c/o cough and SOB    ROS:  (bold if positive, if negative)    Tolerating PT  Tolerating Diet        Objective:     Last 24hrs VS reviewed since prior progress note.  Most recent are:    Visit Vitals  BP (!) 167/89 (BP 1 Location: Right upper arm, BP Patient Position: At rest)   Pulse 68   Temp 98.2 °F (36.8 °C)   Resp 21   Ht 6' 3\" (1.905 m)   Wt 117.9 kg (260 lb)   SpO2 94%   BMI 32.50 kg/m²     SpO2 Readings from Last 6 Encounters:   21 94%    O2 Flow Rate (L/min): 20 l/min       Intake/Output Summary (Last 24 hours) at 2021 1135  Last data filed at 2021 0742  Gross per 24 hour   Intake 600 ml Output 4250 ml   Net -3650 ml        Physical Exam:    Gen:  Well-developed, well-nourished, in no acute distress  HEENT:  Pink conjunctivae, PERRL, hearing intact to voice, moist mucous membranes  Neck:  Supple, without masses, thyroid non-tender  Resp:  No accessory muscle use, clear breath sounds without wheezes rales or rhonchi  Card:  No murmurs, normal S1, S2 without thrills, bruits or peripheral edema  Abd:  Soft, non-tender, non-distended, normoactive bowel sounds are present, no palpable organomegaly and no detectable hernias  Lymph:  No cervical or inguinal adenopathy  Musc:  No cyanosis or clubbing  Skin:  No rashes or ulcers, skin turgor is good  Neuro:  Cranial nerves are grossly intact, no focal motor weakness, follows commands appropriately  Psych:  Good insight, oriented to person, place and time, alert  __________________________________________________________________  Medications Reviewed: (see below)  Medications:     Current Facility-Administered Medications   Medication Dose Route Frequency    bumetanide (BUMEX) injection 1 mg  1 mg IntraVENous DAILY    insulin lispro (HUMALOG) injection 10 Units  10 Units SubCUTAneous TIDAC    dexamethasone (PF) (DECADRON) 10 mg/mL injection 10 mg  10 mg IntraVENous Q12H    losartan (COZAAR) tablet 50 mg  50 mg Oral DAILY    hydrALAZINE (APRESOLINE) 20 mg/mL injection 20 mg  20 mg IntraVENous Q6H PRN    insulin NPH (NOVOLIN N, HUMULIN N) injection 30 Units  30 Units SubCUTAneous ACB&D    insulin lispro (HUMALOG) injection   SubCUTAneous AC&HS    glucose chewable tablet 16 g  4 Tablet Oral PRN    dextrose (D50W) injection syrg 12.5-25 g  12.5-25 g IntraVENous PRN    glucagon (GLUCAGEN) injection 1 mg  1 mg IntraMUSCular PRN    aspirin delayed-release tablet 81 mg  81 mg Oral DAILY    atorvastatin (LIPITOR) tablet 80 mg  80 mg Oral DAILY    glipiZIDE (GLUCOTROL) tablet 5 mg  5 mg Oral BID    metoprolol tartrate (LOPRESSOR) tablet 50 mg  50 mg Oral BID    spironolactone (ALDACTONE) tablet 25 mg  25 mg Oral DAILY    ticagrelor (BRILINTA) tablet 60 mg  60 mg Oral BID    baricitinib (OLUMIANT) tablet 4 mg  4 mg Oral DAILY    sodium chloride (NS) flush 5-40 mL  5-40 mL IntraVENous Q8H    sodium chloride (NS) flush 5-40 mL  5-40 mL IntraVENous PRN    acetaminophen (TYLENOL) tablet 650 mg  650 mg Oral Q6H PRN    Or    acetaminophen (TYLENOL) suppository 650 mg  650 mg Rectal Q6H PRN    polyethylene glycol (MIRALAX) packet 17 g  17 g Oral DAILY PRN    ondansetron (ZOFRAN ODT) tablet 4 mg  4 mg Oral Q8H PRN    Or    ondansetron (ZOFRAN) injection 4 mg  4 mg IntraVENous Q6H PRN    enoxaparin (LOVENOX) injection 40 mg  40 mg SubCUTAneous DAILY        Lab Data Reviewed: (see below)  Lab Review:     Recent Labs     12/04/21 0626 12/03/21  0513   WBC 10.9 6.1   HGB 12.4 12.4   HCT 35.2* 35.6*   * 538*     Recent Labs     12/04/21  0626 12/03/21  0513   * 132*   K 5.1 4.2    102   CO2 26 26   * 304*   BUN 22* 14   CREA 0.72 0.66*   CA 8.7 8.6   ALB 2.0* 1.9*   TBILI 0.3 0.4   ALT 35 38     Lab Results   Component Value Date/Time    Glucose (POC) 344 (H) 12/04/2021 07:41 AM    Glucose (POC) 371 (H) 12/03/2021 08:37 PM    Glucose (POC) 409 (H) 12/03/2021 03:08 PM    Glucose (POC) 386 (H) 12/03/2021 11:07 AM    Glucose (POC) 291 (H) 12/03/2021 08:42 AM     No results for input(s): PH, PCO2, PO2, HCO3, FIO2 in the last 72 hours. No results for input(s): INR, INREXT, INREXT in the last 72 hours.   All Micro Results     Procedure Component Value Units Date/Time    COVID-19 RAPID TEST [[id number]]  (Abnormal) Collected: 11/28/21 1245    Order Status: Completed Specimen: Nasopharyngeal Updated: 11/28/21 1323     Specimen source Nasopharyngeal        COVID-19 rapid test Detected        Comment: CALLED TO AND READ BACK BY  SUKHWINDER HALL,1320,DK  Rapid Abbott ID Now       The specimen is POSITIVE for SARS-CoV-2, the novel coronavirus associated with COVID-19. This test has been authorized by the FDA under an Emergency Use Authorization (EUA) for use by authorized laboratories. Fact sheet for Healthcare Providers: ConventionUpdate.co.nz  Fact sheet for Patients: ConventionUpdate.co.nz       Methodology: Isothermal Nucleic Acid Amplification               I have reviewed notes of prior 24hr. Other pertinent lab: Total time spent with patient: 28 I personally reviewed chart, notes, data and current medications in the medical record. I have personally examined and treated the patient at bedside during this period.                  Care Plan discussed with: Patient, Nursing Staff and >50% of time spent in counseling and coordination of care    Discussed:  Care Plan    Prophylaxis:  Lovenox    Disposition:  Home w/Family           ___________________________________________________    Attending Physician: Bernice Whitaker MD

## 2021-12-04 NOTE — PROGRESS NOTES
0730- Bedside and Verbal shift change report given to Zuleyka Obrien RN (oncoming nurse) by Costa Sethi RN (offgoing nurse). Report included the following information SBAR, Kardex, ED Summary, OR Summary, Procedure Summary, Intake/Output, MAR, Accordion, and Recent Results. This patient was assisted with Intentional Toileting every 2 hours during this shift. Documentation of ambulation and output reflected on Flowsheet. 1930-Bedside and Verbal shift change report given to RN (oncoming nurse) by Zuleyka Obrien RN(offgoing nurse). Report included the following information SBAR, Kardex, ED Summary, OR Summary, Procedure Summary, Intake/Output, MAR, Accordion, Recent Results, and Med Rec Status.

## 2021-12-05 LAB
ALBUMIN SERPL-MCNC: 2 G/DL (ref 3.5–5)
ALBUMIN/GLOB SERPL: 0.5 {RATIO} (ref 1.1–2.2)
ALP SERPL-CCNC: 49 U/L (ref 45–117)
ALT SERPL-CCNC: 32 U/L (ref 12–78)
ANION GAP SERPL CALC-SCNC: 7 MMOL/L (ref 5–15)
AST SERPL-CCNC: 14 U/L (ref 15–37)
BASOPHILS # BLD: 0 K/UL (ref 0–0.1)
BASOPHILS NFR BLD: 0 % (ref 0–1)
BILIRUB SERPL-MCNC: 0.3 MG/DL (ref 0.2–1)
BUN SERPL-MCNC: 26 MG/DL (ref 6–20)
BUN/CREAT SERPL: 33 (ref 12–20)
CALCIUM SERPL-MCNC: 8.8 MG/DL (ref 8.5–10.1)
CHLORIDE SERPL-SCNC: 99 MMOL/L (ref 97–108)
CO2 SERPL-SCNC: 26 MMOL/L (ref 21–32)
CREAT SERPL-MCNC: 0.78 MG/DL (ref 0.7–1.3)
CRP SERPL-MCNC: 1.39 MG/DL (ref 0–0.6)
DIFFERENTIAL METHOD BLD: ABNORMAL
EOSINOPHIL # BLD: 0 K/UL (ref 0–0.4)
EOSINOPHIL NFR BLD: 0 % (ref 0–7)
ERYTHROCYTE [DISTWIDTH] IN BLOOD BY AUTOMATED COUNT: 11.5 % (ref 11.5–14.5)
FERRITIN SERPL-MCNC: 864 NG/ML (ref 26–388)
GLOBULIN SER CALC-MCNC: 4 G/DL (ref 2–4)
GLUCOSE BLD STRIP.AUTO-MCNC: 253 MG/DL (ref 65–117)
GLUCOSE BLD STRIP.AUTO-MCNC: 269 MG/DL (ref 65–117)
GLUCOSE BLD STRIP.AUTO-MCNC: 291 MG/DL (ref 65–117)
GLUCOSE BLD STRIP.AUTO-MCNC: 329 MG/DL (ref 65–117)
GLUCOSE BLD STRIP.AUTO-MCNC: 376 MG/DL (ref 65–117)
GLUCOSE SERPL-MCNC: 265 MG/DL (ref 65–100)
HCT VFR BLD AUTO: 36.1 % (ref 36.6–50.3)
HGB BLD-MCNC: 12.7 G/DL (ref 12.1–17)
IMM GRANULOCYTES # BLD AUTO: 0.7 K/UL (ref 0–0.04)
IMM GRANULOCYTES NFR BLD AUTO: 6 % (ref 0–0.5)
LYMPHOCYTES # BLD: 0.6 K/UL (ref 0.8–3.5)
LYMPHOCYTES NFR BLD: 5 % (ref 12–49)
MCH RBC QN AUTO: 31.4 PG (ref 26–34)
MCHC RBC AUTO-ENTMCNC: 35.2 G/DL (ref 30–36.5)
MCV RBC AUTO: 89.1 FL (ref 80–99)
MONOCYTES # BLD: 0.4 K/UL (ref 0–1)
MONOCYTES NFR BLD: 3 % (ref 5–13)
NEUTS SEG # BLD: 10.6 K/UL (ref 1.8–8)
NEUTS SEG NFR BLD: 86 % (ref 32–75)
NRBC # BLD: 0 K/UL (ref 0–0.01)
NRBC BLD-RTO: 0 PER 100 WBC
PLATELET # BLD AUTO: 608 K/UL (ref 150–400)
PLATELET COMMENTS,PCOM: ABNORMAL
PMV BLD AUTO: 9.9 FL (ref 8.9–12.9)
POTASSIUM SERPL-SCNC: 5.4 MMOL/L (ref 3.5–5.1)
PROT SERPL-MCNC: 6 G/DL (ref 6.4–8.2)
RBC # BLD AUTO: 4.05 M/UL (ref 4.1–5.7)
RBC MORPH BLD: ABNORMAL
SERVICE CMNT-IMP: ABNORMAL
SODIUM SERPL-SCNC: 132 MMOL/L (ref 136–145)
WBC # BLD AUTO: 12.3 K/UL (ref 4.1–11.1)

## 2021-12-05 PROCEDURE — 65660000000 HC RM CCU STEPDOWN

## 2021-12-05 PROCEDURE — 74011000250 HC RX REV CODE- 250: Performed by: PHYSICIAN ASSISTANT

## 2021-12-05 PROCEDURE — 36415 COLL VENOUS BLD VENIPUNCTURE: CPT

## 2021-12-05 PROCEDURE — 74011250637 HC RX REV CODE- 250/637: Performed by: INTERNAL MEDICINE

## 2021-12-05 PROCEDURE — 85025 COMPLETE CBC W/AUTO DIFF WBC: CPT

## 2021-12-05 PROCEDURE — 80053 COMPREHEN METABOLIC PANEL: CPT

## 2021-12-05 PROCEDURE — 74011250636 HC RX REV CODE- 250/636: Performed by: INTERNAL MEDICINE

## 2021-12-05 PROCEDURE — 74011250637 HC RX REV CODE- 250/637: Performed by: STUDENT IN AN ORGANIZED HEALTH CARE EDUCATION/TRAINING PROGRAM

## 2021-12-05 PROCEDURE — 86140 C-REACTIVE PROTEIN: CPT

## 2021-12-05 PROCEDURE — 82962 GLUCOSE BLOOD TEST: CPT

## 2021-12-05 PROCEDURE — 77010033711 HC HIGH FLOW OXYGEN

## 2021-12-05 PROCEDURE — 94760 N-INVAS EAR/PLS OXIMETRY 1: CPT

## 2021-12-05 PROCEDURE — 74011636637 HC RX REV CODE- 636/637: Performed by: INTERNAL MEDICINE

## 2021-12-05 RX ORDER — IPRATROPIUM BROMIDE AND ALBUTEROL SULFATE 2.5; .5 MG/3ML; MG/3ML
3 SOLUTION RESPIRATORY (INHALATION)
Status: DISCONTINUED | OUTPATIENT
Start: 2021-12-05 | End: 2021-12-06 | Stop reason: HOSPADM

## 2021-12-05 RX ORDER — GUAIFENESIN 600 MG/1
600 TABLET, EXTENDED RELEASE ORAL EVERY 12 HOURS
Status: DISCONTINUED | OUTPATIENT
Start: 2021-12-05 | End: 2021-12-06 | Stop reason: HOSPADM

## 2021-12-05 RX ORDER — BENZONATATE 100 MG/1
100 CAPSULE ORAL
Status: DISCONTINUED | OUTPATIENT
Start: 2021-12-05 | End: 2021-12-06 | Stop reason: HOSPADM

## 2021-12-05 RX ADMIN — INSULIN LISPRO 6 UNITS: 100 INJECTION, SOLUTION INTRAVENOUS; SUBCUTANEOUS at 17:18

## 2021-12-05 RX ADMIN — GLIPIZIDE 5 MG: 5 TABLET ORAL at 17:18

## 2021-12-05 RX ADMIN — SODIUM ZIRCONIUM CYCLOSILICATE 15 G: 10 POWDER, FOR SUSPENSION ORAL at 07:03

## 2021-12-05 RX ADMIN — Medication 10 ML: at 04:29

## 2021-12-05 RX ADMIN — INSULIN LISPRO 12 UNITS: 100 INJECTION, SOLUTION INTRAVENOUS; SUBCUTANEOUS at 09:12

## 2021-12-05 RX ADMIN — TICAGRELOR 60 MG: 60 TABLET ORAL at 09:15

## 2021-12-05 RX ADMIN — GUAIFENESIN 600 MG: 600 TABLET ORAL at 23:40

## 2021-12-05 RX ADMIN — INSULIN LISPRO 12 UNITS: 100 INJECTION, SOLUTION INTRAVENOUS; SUBCUTANEOUS at 12:33

## 2021-12-05 RX ADMIN — BARICITINIB 4 MG: 2 TABLET, FILM COATED ORAL at 09:12

## 2021-12-05 RX ADMIN — METOPROLOL TARTRATE 50 MG: 50 TABLET, FILM COATED ORAL at 09:12

## 2021-12-05 RX ADMIN — INSULIN LISPRO 7 UNITS: 100 INJECTION, SOLUTION INTRAVENOUS; SUBCUTANEOUS at 12:32

## 2021-12-05 RX ADMIN — ENOXAPARIN SODIUM 40 MG: 100 INJECTION SUBCUTANEOUS at 09:12

## 2021-12-05 RX ADMIN — INSULIN LISPRO 3 UNITS: 100 INJECTION, SOLUTION INTRAVENOUS; SUBCUTANEOUS at 23:40

## 2021-12-05 RX ADMIN — ASPIRIN 81 MG: 81 TABLET, COATED ORAL at 09:12

## 2021-12-05 RX ADMIN — METOPROLOL TARTRATE 50 MG: 50 TABLET, FILM COATED ORAL at 17:18

## 2021-12-05 RX ADMIN — BUMETANIDE 1 MG: 0.25 INJECTION INTRAMUSCULAR; INTRAVENOUS at 09:12

## 2021-12-05 RX ADMIN — ATORVASTATIN CALCIUM 80 MG: 20 TABLET, FILM COATED ORAL at 09:12

## 2021-12-05 RX ADMIN — INSULIN LISPRO 12 UNITS: 100 INJECTION, SOLUTION INTRAVENOUS; SUBCUTANEOUS at 17:19

## 2021-12-05 RX ADMIN — INSULIN HUMAN 30 UNITS: 100 INJECTION, SUSPENSION SUBCUTANEOUS at 09:12

## 2021-12-05 RX ADMIN — TICAGRELOR 60 MG: 60 TABLET ORAL at 17:22

## 2021-12-05 RX ADMIN — INSULIN LISPRO 5 UNITS: 100 INJECTION, SOLUTION INTRAVENOUS; SUBCUTANEOUS at 09:13

## 2021-12-05 RX ADMIN — DEXAMETHASONE SODIUM PHOSPHATE 10 MG: 10 INJECTION, SOLUTION INTRAMUSCULAR; INTRAVENOUS at 22:09

## 2021-12-05 RX ADMIN — Medication 10 ML: at 22:09

## 2021-12-05 RX ADMIN — GLIPIZIDE 5 MG: 5 TABLET ORAL at 09:12

## 2021-12-05 RX ADMIN — Medication 10 ML: at 17:19

## 2021-12-05 RX ADMIN — INSULIN HUMAN 30 UNITS: 100 INJECTION, SUSPENSION SUBCUTANEOUS at 17:18

## 2021-12-05 RX ADMIN — DEXAMETHASONE SODIUM PHOSPHATE 10 MG: 10 INJECTION, SOLUTION INTRAMUSCULAR; INTRAVENOUS at 09:12

## 2021-12-05 NOTE — PROGRESS NOTES
0700  Bedside and Verbal shift change report given to [facility] (oncoming nurse) by [address] (offgoing nurse). Report included the following information SBAR, Kardex, Procedure Summary, Intake/Output, MAR, Accordion and Recent Results. Initial assessment done. AOX4. Denies any pain. Titrate the oxygen down to 13L midflow. Will continue to monitor. Visit Vitals  BP (!) 173/90 (BP 1 Location: Left upper arm)   Pulse 77   Temp 98.1 °F (36.7 °C)   Resp 22   Ht 6' 3\" (1.905 m)   Wt 117.9 kg (260 lb)   SpO2 97%   BMI 32.50 kg/m²     1915  Bedside and Verbal shift change report given to [address] (oncoming nurse) by Jenae Jones RN (offgoing nurse). Report included the following information SBAR, Kardex, Procedure Summary, Intake/Output, MAR, Accordion and Recent Results.

## 2021-12-05 NOTE — PROGRESS NOTES
Eleazar Pinzon [facility] 79  [address] [facility], [address] [place] [address]  [phone]      Medical Progress Note      NAME: Leopold Gemma   :  1970  MRM:  [id number]    Date of service: 2021  7:05 AM       Assessment and Plan:   1. Acute hypoxic respiratory failure due to COVID 19. Now on high flow O2. Continue supplement O2 to keep SAO2 > 90%. On 13L HFNC. Started on bumex IV by pulm. Pulmonary evaluation appreciated      2. Covid 19 pneumonia. Continue decadron(increased to BID ), baricitinib; trend inflammatory markers. Encourage prone position. Recent CXR: Increased bilateral peripheral airspace opacities    3. DM2 with hyperglycemia - poorly controlled. A1C: 11.7. SSI. Worsened BG due to steroid. Started on NPH and lispro with meals. Continue SSI      4. Hyponatremia. Improving. continue IVF and monitor     5. CAD s/p stents - on lipitor, brilinta, asa     6. HTN. Not well controlled. Increased losartan to 50 mg, continue metoprolol and spironolactone. 7.  Obesity - BMI 32.54. would benefit from weight loss     pt is from [place]. He sometime gets frustrated by his prolonged stay in the hospital.            Subjective:     Chief Complaint[de-identified] Patient was seen and examined as a follow up for COVID. Chart was reviewed. feels better. ROS:  (bold if positive, if negative)    Tolerating PT  Tolerating Diet        Objective:     Last 24hrs VS reviewed since prior progress note.  Most recent are:    Visit Vitals  /73 (BP 1 Location: Left upper arm)   Pulse 79   Temp 97.7 °F (36.5 °C)   Resp 15   Ht 6' 3\" (1.905 m)   Wt 117.9 kg (260 lb)   SpO2 91%   BMI 32.50 kg/m²     SpO2 Readings from Last 6 Encounters:   21 91%    O2 Flow Rate (L/min): 13 l/min       Intake/Output Summary (Last 24 hours) at 2021 1342  Last data filed at 2021 1054  Gross per 24 hour   Intake    Output 2070 ml   Net -2070 ml        Physical Exam:    Gen:  Well-developed, well-nourished, in no acute distress  HEENT:  Pink conjunctivae, PERRL, hearing intact to voice, moist mucous membranes  Neck:  Supple, without masses, thyroid non-tender  Resp:  No accessory muscle use, clear breath sounds without wheezes rales or rhonchi  Card:  No murmurs, normal S1, S2 without thrills, bruits or peripheral edema  Abd:  Soft, non-tender, non-distended, normoactive bowel sounds are present, no palpable organomegaly and no detectable hernias  Lymph:  No cervical or inguinal adenopathy  Musc:  No cyanosis or clubbing  Skin:  No rashes or ulcers, skin turgor is good  Neuro:  Cranial nerves are grossly intact, no focal motor weakness, follows commands appropriately  Psych:  Good insight, oriented to person, place and time, alert  __________________________________________________________________  Medications Reviewed: (see below)  Medications:     Current Facility-Administered Medications   Medication Dose Route Frequency    insulin lispro (HUMALOG) injection 12 Units  12 Units SubCUTAneous TIDAC    bumetanide (BUMEX) injection 1 mg  1 mg IntraVENous DAILY    dexamethasone (PF) (DECADRON) 10 mg/mL injection 10 mg  10 mg IntraVENous Q12H    [Held by provider] losartan (COZAAR) tablet 50 mg  50 mg Oral DAILY    hydrALAZINE (APRESOLINE) 20 mg/mL injection 20 mg  20 mg IntraVENous Q6H PRN    insulin NPH (NOVOLIN N, HUMULIN N) injection 30 Units  30 Units SubCUTAneous ACB&D    insulin lispro (HUMALOG) injection   SubCUTAneous AC&HS    glucose chewable tablet 16 g  4 Tablet Oral PRN    dextrose (D50W) injection syrg 12.5-25 g  12.5-25 g IntraVENous PRN    glucagon (GLUCAGEN) injection 1 mg  1 mg IntraMUSCular PRN    aspirin delayed-release tablet 81 mg  81 mg Oral DAILY    atorvastatin (LIPITOR) tablet 80 mg  80 mg Oral DAILY    glipiZIDE (GLUCOTROL) tablet 5 mg  5 mg Oral BID    metoprolol tartrate (LOPRESSOR) tablet 50 mg  50 mg Oral BID    [Held by provider] spironolactone (ALDACTONE) tablet 25 mg  25 mg Oral DAILY    ticagrelor (BRILINTA) tablet 60 mg  60 mg Oral BID    baricitinib (OLUMIANT) tablet 4 mg  4 mg Oral DAILY    sodium chloride (NS) flush 5-40 mL  5-40 mL IntraVENous Q8H    sodium chloride (NS) flush 5-40 mL  5-40 mL IntraVENous PRN    acetaminophen (TYLENOL) tablet 650 mg  650 mg Oral Q6H PRN    Or    acetaminophen (TYLENOL) suppository 650 mg  650 mg Rectal Q6H PRN    polyethylene glycol (MIRALAX) packet 17 g  17 g Oral DAILY PRN    ondansetron (ZOFRAN ODT) tablet 4 mg  4 mg Oral Q8H PRN    Or    ondansetron (ZOFRAN) injection 4 mg  4 mg IntraVENous Q6H PRN    enoxaparin (LOVENOX) injection 40 mg  40 mg SubCUTAneous DAILY        Lab Data Reviewed: (see below)  Lab Review:     Recent Labs     12/05/21 0431 12/04/21  0626 12/03/21  0513   WBC 12.3* 10.9 6.1   HGB 12.7 12.4 12.4   HCT 36.1* 35.2* 35.6*   * 588* 538*     Recent Labs     12/05/21  0431 12/04/21  0626 12/03/21  0513   * 134* 132*   K 5.4* 5.1 4.2   CL 99 100 102   CO2 26 26 26   * 313* 304*   BUN 26* 22* 14   CREA 0.78 0.72 0.66*   CA 8.8 8.7 8.6   ALB 2.0* 2.0* 1.9*   TBILI 0.3 0.3 0.4   ALT 32 35 38     Lab Results   Component Value Date/Time    Glucose (POC) 329 (H) 12/05/2021 10:56 AM    Glucose (POC) 253 (H) 12/05/2021 08:00 AM    Glucose (POC) 316 (H) 12/04/2021 09:30 PM    Glucose (POC) 355 (H) 12/04/2021 04:17 PM    Glucose (POC) 376 (H) 12/04/2021 11:39 AM     No results for input(s): PH, PCO2, PO2, HCO3, FIO2 in the last 72 hours. No results for input(s): INR, INREXT, INREXT in the last 72 hours.   All Micro Results     Procedure Component Value Units Date/Time    COVID-19 RAPID TEST [[id number]]  (Abnormal) Collected: 11/28/21 1245    Order Status: Completed Specimen: Nasopharyngeal Updated: 11/28/21 1323     Specimen source Nasopharyngeal        COVID-19 rapid test Detected        Comment: CALLED TO AND READ BACK BY  SUKHWINDER HALL,1320,DK  Rapid Abbott ID Now       The specimen is POSITIVE for SARS-CoV-2, the novel coronavirus associated with COVID-19. This test has been authorized by the FDA under an Emergency Use Authorization (EUA) for use by authorized laboratories. Fact sheet for Healthcare Providers: ConventionUpdate.co.nz  Fact sheet for Patients: ConventionUpdate.co.nz       Methodology: Isothermal Nucleic Acid Amplification               I have reviewed notes of prior 24hr. Other pertinent lab: Total time spent with patient: 28 I personally reviewed chart, notes, data and current medications in the medical record. I have personally examined and treated the patient at bedside during this period.                  Care Plan discussed with: Patient, Nursing Staff and >50% of time spent in counseling and coordination of care    Discussed:  Care Plan    Prophylaxis:  Lovenox    Disposition:  Home w/Family           ___________________________________________________    Attending Physician: Charbel Hughes MD

## 2021-12-05 NOTE — PROGRESS NOTES
0430      Patient was titrated down to 15 L humidified Midflow. He is tolerating transition well with SpO1 93-95%. He is currently sitting at edge of bed voiding.     0655    Patient K+ level is 5.4 at this time. MD paged to make aware.  No changes noted on telemetry

## 2021-12-05 NOTE — PROGRESS NOTES
Report given to Declan Islas RN. Resting quietly all shift. No complaints. VSS. NIBP remains 160's-170's. See progress note. Pt states he knows he needs to work on DM and HTN when he gets back to [facility]. O2 HF 20L/ 85% fio2, Sat 96-97%. Slight GAINES.

## 2021-12-05 NOTE — PROGRESS NOTES
K elevated at 5.4. Patient is on losartan and aldactone. Will hold both. Continue bumex which should help with hyperkalemia. Will give a dose of lokelma. Continue hydralazine PRN for elevated BP.

## 2021-12-06 VITALS
SYSTOLIC BLOOD PRESSURE: 125 MMHG | OXYGEN SATURATION: 90 % | HEART RATE: 82 BPM | BODY MASS INDEX: 32.33 KG/M2 | WEIGHT: 260 LBS | HEIGHT: 75 IN | DIASTOLIC BLOOD PRESSURE: 69 MMHG | TEMPERATURE: 98.1 F | RESPIRATION RATE: 29 BRPM

## 2021-12-06 LAB
ALBUMIN SERPL-MCNC: 2.1 G/DL (ref 3.5–5)
ALBUMIN/GLOB SERPL: 0.5 {RATIO} (ref 1.1–2.2)
ALP SERPL-CCNC: 50 U/L (ref 45–117)
ALT SERPL-CCNC: 29 U/L (ref 12–78)
ANION GAP SERPL CALC-SCNC: 6 MMOL/L (ref 5–15)
AST SERPL-CCNC: 19 U/L (ref 15–37)
BASOPHILS # BLD: 0 K/UL (ref 0–0.1)
BASOPHILS NFR BLD: 0 % (ref 0–1)
BILIRUB SERPL-MCNC: 0.4 MG/DL (ref 0.2–1)
BNP SERPL-MCNC: 724 PG/ML
BUN SERPL-MCNC: 24 MG/DL (ref 6–20)
BUN/CREAT SERPL: 37 (ref 12–20)
CALCIUM SERPL-MCNC: 8.6 MG/DL (ref 8.5–10.1)
CHLORIDE SERPL-SCNC: 98 MMOL/L (ref 97–108)
CO2 SERPL-SCNC: 27 MMOL/L (ref 21–32)
CREAT SERPL-MCNC: 0.65 MG/DL (ref 0.7–1.3)
CRP SERPL-MCNC: 0.9 MG/DL (ref 0–0.6)
D DIMER PPP FEU-MCNC: 1.09 MG/L FEU (ref 0–0.65)
DIFFERENTIAL METHOD BLD: ABNORMAL
EOSINOPHIL # BLD: 0 K/UL (ref 0–0.4)
EOSINOPHIL NFR BLD: 0 % (ref 0–7)
ERYTHROCYTE [DISTWIDTH] IN BLOOD BY AUTOMATED COUNT: 11.7 % (ref 11.5–14.5)
FERRITIN SERPL-MCNC: 812 NG/ML (ref 26–388)
GLOBULIN SER CALC-MCNC: 4.1 G/DL (ref 2–4)
GLUCOSE BLD STRIP.AUTO-MCNC: 282 MG/DL (ref 65–117)
GLUCOSE BLD STRIP.AUTO-MCNC: 407 MG/DL (ref 65–117)
GLUCOSE SERPL-MCNC: 242 MG/DL (ref 65–100)
HCT VFR BLD AUTO: 36.1 % (ref 36.6–50.3)
HGB BLD-MCNC: 12.7 G/DL (ref 12.1–17)
IMM GRANULOCYTES # BLD AUTO: 0.6 K/UL (ref 0–0.04)
IMM GRANULOCYTES NFR BLD AUTO: 5 % (ref 0–0.5)
LYMPHOCYTES # BLD: 0.6 K/UL (ref 0.8–3.5)
LYMPHOCYTES NFR BLD: 5 % (ref 12–49)
MAGNESIUM SERPL-MCNC: 2 MG/DL (ref 1.6–2.4)
MCH RBC QN AUTO: 31.1 PG (ref 26–34)
MCHC RBC AUTO-ENTMCNC: 35.2 G/DL (ref 30–36.5)
MCV RBC AUTO: 88.3 FL (ref 80–99)
MONOCYTES # BLD: 0.5 K/UL (ref 0–1)
MONOCYTES NFR BLD: 4 % (ref 5–13)
NEUTS SEG # BLD: 9.8 K/UL (ref 1.8–8)
NEUTS SEG NFR BLD: 86 % (ref 32–75)
NRBC # BLD: 0 K/UL (ref 0–0.01)
NRBC BLD-RTO: 0 PER 100 WBC
PHOSPHATE SERPL-MCNC: 4.3 MG/DL (ref 2.6–4.7)
PLATELET # BLD AUTO: 624 K/UL (ref 150–400)
PMV BLD AUTO: 9.9 FL (ref 8.9–12.9)
POTASSIUM SERPL-SCNC: 5.1 MMOL/L (ref 3.5–5.1)
PROT SERPL-MCNC: 6.2 G/DL (ref 6.4–8.2)
RBC # BLD AUTO: 4.09 M/UL (ref 4.1–5.7)
RBC MORPH BLD: ABNORMAL
SERVICE CMNT-IMP: ABNORMAL
SERVICE CMNT-IMP: ABNORMAL
SODIUM SERPL-SCNC: 131 MMOL/L (ref 136–145)
WBC # BLD AUTO: 11.5 K/UL (ref 4.1–11.1)

## 2021-12-06 PROCEDURE — 74011250637 HC RX REV CODE- 250/637: Performed by: INTERNAL MEDICINE

## 2021-12-06 PROCEDURE — 86140 C-REACTIVE PROTEIN: CPT

## 2021-12-06 PROCEDURE — 74011636637 HC RX REV CODE- 636/637: Performed by: INTERNAL MEDICINE

## 2021-12-06 PROCEDURE — 36415 COLL VENOUS BLD VENIPUNCTURE: CPT

## 2021-12-06 PROCEDURE — 85379 FIBRIN DEGRADATION QUANT: CPT

## 2021-12-06 PROCEDURE — 84100 ASSAY OF PHOSPHORUS: CPT

## 2021-12-06 PROCEDURE — 82728 ASSAY OF FERRITIN: CPT

## 2021-12-06 PROCEDURE — 74011000250 HC RX REV CODE- 250: Performed by: PHYSICIAN ASSISTANT

## 2021-12-06 PROCEDURE — 74011250636 HC RX REV CODE- 250/636: Performed by: INTERNAL MEDICINE

## 2021-12-06 PROCEDURE — 77010033678 HC OXYGEN DAILY

## 2021-12-06 PROCEDURE — 80053 COMPREHEN METABOLIC PANEL: CPT

## 2021-12-06 PROCEDURE — 83880 ASSAY OF NATRIURETIC PEPTIDE: CPT

## 2021-12-06 PROCEDURE — 94618 PULMONARY STRESS TESTING: CPT

## 2021-12-06 PROCEDURE — 82962 GLUCOSE BLOOD TEST: CPT

## 2021-12-06 PROCEDURE — 83735 ASSAY OF MAGNESIUM: CPT

## 2021-12-06 PROCEDURE — 85025 COMPLETE CBC W/AUTO DIFF WBC: CPT

## 2021-12-06 RX ORDER — GUAIFENESIN 600 MG/1
600 TABLET, EXTENDED RELEASE ORAL EVERY 12 HOURS
Qty: 30 TABLET | Refills: 0 | Status: SHIPPED | OUTPATIENT
Start: 2021-12-06

## 2021-12-06 RX ORDER — BENZONATATE 100 MG/1
100 CAPSULE ORAL
Qty: 30 CAPSULE | Refills: 0 | Status: SHIPPED | OUTPATIENT
Start: 2021-12-06

## 2021-12-06 RX ORDER — DEXAMETHASONE 6 MG/1
6 TABLET ORAL
Qty: 3 TABLET | Refills: 0 | Status: SHIPPED | OUTPATIENT
Start: 2021-12-06

## 2021-12-06 RX ORDER — ALBUTEROL SULFATE 90 UG/1
2 AEROSOL, METERED RESPIRATORY (INHALATION)
Qty: 18 G | Refills: 1 | Status: SHIPPED | OUTPATIENT
Start: 2021-12-06

## 2021-12-06 RX ORDER — BUMETANIDE 1 MG/1
1 TABLET ORAL DAILY
Qty: 30 TABLET | Refills: 0 | Status: SHIPPED | OUTPATIENT
Start: 2021-12-06

## 2021-12-06 RX ORDER — DEXAMETHASONE SODIUM PHOSPHATE 10 MG/ML
6 INJECTION INTRAMUSCULAR; INTRAVENOUS EVERY 24 HOURS
Status: DISCONTINUED | OUTPATIENT
Start: 2021-12-07 | End: 2021-12-06 | Stop reason: HOSPADM

## 2021-12-06 RX ADMIN — INSULIN LISPRO 5 UNITS: 100 INJECTION, SOLUTION INTRAVENOUS; SUBCUTANEOUS at 07:30

## 2021-12-06 RX ADMIN — GUAIFENESIN 600 MG: 600 TABLET ORAL at 08:59

## 2021-12-06 RX ADMIN — METOPROLOL TARTRATE 50 MG: 50 TABLET, FILM COATED ORAL at 08:59

## 2021-12-06 RX ADMIN — Medication 10 ML: at 05:48

## 2021-12-06 RX ADMIN — TICAGRELOR 60 MG: 60 TABLET ORAL at 09:08

## 2021-12-06 RX ADMIN — INSULIN LISPRO 12 UNITS: 100 INJECTION, SOLUTION INTRAVENOUS; SUBCUTANEOUS at 12:37

## 2021-12-06 RX ADMIN — ENOXAPARIN SODIUM 40 MG: 100 INJECTION SUBCUTANEOUS at 08:58

## 2021-12-06 RX ADMIN — INSULIN HUMAN 30 UNITS: 100 INJECTION, SUSPENSION SUBCUTANEOUS at 08:58

## 2021-12-06 RX ADMIN — ASPIRIN 81 MG: 81 TABLET, COATED ORAL at 08:59

## 2021-12-06 RX ADMIN — INSULIN LISPRO 8 UNITS: 100 INJECTION, SOLUTION INTRAVENOUS; SUBCUTANEOUS at 12:37

## 2021-12-06 RX ADMIN — BUMETANIDE 1 MG: 0.25 INJECTION INTRAMUSCULAR; INTRAVENOUS at 08:58

## 2021-12-06 RX ADMIN — ATORVASTATIN CALCIUM 80 MG: 20 TABLET, FILM COATED ORAL at 08:59

## 2021-12-06 RX ADMIN — GLIPIZIDE 5 MG: 5 TABLET ORAL at 08:59

## 2021-12-06 RX ADMIN — INSULIN LISPRO 12 UNITS: 100 INJECTION, SOLUTION INTRAVENOUS; SUBCUTANEOUS at 07:30

## 2021-12-06 RX ADMIN — BARICITINIB 4 MG: 2 TABLET, FILM COATED ORAL at 08:59

## 2021-12-06 RX ADMIN — DEXAMETHASONE SODIUM PHOSPHATE 10 MG: 10 INJECTION, SOLUTION INTRAMUSCULAR; INTRAVENOUS at 08:58

## 2021-12-06 NOTE — PROGRESS NOTES
12/06/21 0950   Resting (Room Air)   SpO2 92   HR 72   During Walk (Room Air)   SpO2 85   HR 80   Comments placed on 2L NC   During Walk (On O2)   SpO2 90   HR 85   O2 Device Nasal cannula   O2 Flow Rate (l/min) 2 l/min   After Walk   SpO2 94   HR 74   O2 Device Nasal cannula   O2 Flow Rate (l/min) 2 l/min   Does the Patient Qualify for Home O2 Yes   Liter Flow on Exertion 2   Does the Patient Need Portable Oxygen Tanks Yes

## 2021-12-06 NOTE — DISCHARGE INSTRUCTIONS
HOSPITALIST DISCHARGE INSTRUCTIONS  NAME: Jacqui Rodriguez   :  1970   MRN:  [id number]     Date/Time:  2021 9:51 AM    ADMIT DATE: 2021     DISCHARGE DATE: 2021     ADMITTING DIAGNOSIS:  COVID pneumonia, hypoxia    DISCHARGE DIAGNOSIS:  same    MEDICATIONS:  See after visit summary       · It is important that you take the medication exactly as they are prescribed. · Keep your medication in the bottles provided by the pharmacist and keep a list of the medication names, dosages, and times to be taken in your wallet. · Do not take other medications without consulting your doctor     Pain Management: per above medications    What to do at Home    Recommended diet:  Cardiac Diet, Diabetic diet     Recommended activity: self quarantine for 2 weeks    1) Return to the hospital if you feel worse    2) If you experience any of the following symptoms then please call your primary care physician or return to the emergency room if you cannot get hold of your doctor:  Fever, chills, nausea, vomiting, diarrhea, change in mentation, falling, bleeding, shortness of breath, chest pain, severe headache, severe abdominal pain,     3) Use 2L oxygen as directed. Your primary care doctor can wean     4) Stop taking aldactone due to high potassium level. Bumex was started for fluid control    5) Follow up with your doctors. You might need to be on long term insulin if blood sugar continues to be high     Follow Up: Follow-up Information     Follow up With Specialties Details Why Contact Info    Natalio Griffin MD Internal Medicine Schedule an appointment as soon as possible for a visit in 5 days  [place] [id number] [phone]              Information obtained by :  I understand that if any problems occur once I am at home I am to contact my physician. I understand and acknowledge receipt of the instructions indicated above. Physician's or R.N.'s Signature                                                                  Date/Time                                                                                                                                              Patient or Representative Signature                                                          Date/Time    Patient Education        Learning About Coronavirus ([id number])  What is coronavirus (COVID-19)? COVID-19 is a disease caused by a type of coronavirus. This illness was first found in December 2019. It has since spread worldwide. Coronaviruses are a large group of viruses. They cause the common cold. They also cause more serious illnesses like Middle East respiratory syndrome (MERS) and severe acute respiratory syndrome (SARS). COVID-19 is caused by a novel coronavirus. That means it's a new type that has not been seen in people before. What are the symptoms? COVID-19 symptoms may include:  · Fever. · Cough. · Trouble breathing. · Chills or repeated shaking with chills. · Muscle and body aches. · Headache. · Sore throat. · New loss of taste or smell. · Vomiting. · Diarrhea. In severe cases, COVID-19 can cause pneumonia and make it hard to breathe without help from a machine. It can cause death. How is it diagnosed? COVID-19 is diagnosed with a viral test. This may also be called a PCR test or antigen test. It looks for evidence of the virus in your breathing passages or lungs (respiratory system). The test is most often done on a sample from the nose, throat, or lungs. It's sometimes done on a sample of saliva. One way a sample is collected is by putting a long swab into the back of your nose. How is it treated? Mild cases of COVID-19 can be treated at home.  Serious cases need treatment in the hospital. Treatment may include medicines to reduce symptoms, plus breathing support such as oxygen therapy or a ventilator. Some people may be placed on their belly to help their oxygen levels. Treatments that may help people who have COVID-19 include:  Antiviral medicines. These medicines treat viral infections. Remdesivir is an example. Immune-based therapy. These medicines help the immune system fight COVID-19. Examples include monoclonal antibodies. Blood thinners. These medicines help prevent blood clots. People with severe illness are at risk for blood clots. How can you protect yourself and others? The best way to protect yourself from getting sick is to:  · Get vaccinated. · Avoid sick people. · If you are not fully vaccinated:  ? Wear a mask if you have to go to public areas. ? Avoid crowds and try to stay at least 6 feet away from other people. · Cover your mouth with a tissue when you cough or sneeze. · Wash your hands often, especially after you cough or sneeze. Use soap and water, and scrub for at least 20 seconds. If soap and water aren't available, use an alcohol-based hand . · Avoid touching your mouth, nose, and eyes. To help avoid spreading the virus to others:  · Get vaccinated. · Cover your mouth with a tissue when you cough or sneeze. · Wash your hands often, especially after you cough or sneeze. Use soap and water, and scrub for at least 20 seconds. If soap and water aren't available, use an alcohol-based hand . · If you have been exposed to the virus and are not fully vaccinated:  ? Stay home. Don't go to school, work, or public areas. And don't use public transportation, ride-shares, or taxis unless you have no choice. ? Wear a mask if you have to go to public areas, like the pharmacy. · If you're sick:  ? Leave your home only if you need to get medical care. But call the doctor's office first so they know you're coming. And wear a mask. ? Wear a mask whenever you're around other people. ?  Limit contact with pets and people in your home. If possible, stay in a separate bedroom and use a separate bathroom. ? Clean and disinfect your home every day. Use household  and disinfectant wipes or sprays. Take special care to clean things that you touch with your hands. How can you self-isolate when you have COVID-19? If you have COVID-19, there are things you can do to help avoid spreading the virus to others. · Limit contact with people in your home. If possible, stay in a separate bedroom and use a separate bathroom. · Wear a mask when you are around other people. · If you have to leave home, avoid crowds and try to stay at least 6 feet away from other people. · Avoid contact with pets and other animals. · Cover your mouth and nose with a tissue when you cough or sneeze. Then throw it in the trash right away. · Wash your hands often, especially after you cough or sneeze. Use soap and water, and scrub for at least 20 seconds. If soap and water aren't available, use an alcohol-based hand . · Don't share personal household items. These include bedding, towels, cups and glasses, and eating utensils. · [address] in the warmest water allowed for the fabric type, and dry it completely. It's okay to wash other people's laundry with yours. · Clean and disinfect your home. Use household  and disinfectant wipes or sprays. When should you call for help? Call 911 anytime you think you may need emergency care. For example, call if you have life-threatening symptoms, such as:    · You have severe trouble breathing. (You can't talk at all.)     · You have constant chest pain or pressure.     · You are severely dizzy or lightheaded.     · You are confused or can't think clearly.     · You have pale, gray, or blue-colored skin or lips.     · You pass out (lose consciousness) or are very hard to wake up. Call your doctor now or seek immediate medical care if:    · You have moderate trouble breathing. (You can't speak a full sentence.)     · You are coughing up blood (more than about 1 teaspoon).     · You have signs of low blood pressure. These include feeling lightheaded; being too weak to stand; and having cold, pale, clammy skin. Watch closely for changes in your health, and be sure to contact your doctor if:    · Your symptoms get worse.     · You are not getting better as expected.     · You have new or worse symptoms of anxiety, depression, nightmares, or flashbacks. Call before you go to the doctor's office. Follow their instructions. And wear a mask. Current as of: July 1, 2021               Content Version: 13.0  © 2006-2021 Healthwise, Incorporated. Care instructions adapted under license by [employer] (which disclaims liability or warranty for this information). If you have questions about a medical condition or this instruction, always ask your healthcare professional. [address] any warranty or liability for your use of this information.

## 2021-12-06 NOTE — PROGRESS NOTES
CM follow up:    Order received for home oxygen. Discussed with patient who states he will be staying with his brother at discharge at:  [address] [facility]. Patient plans to return to his home in [place] when he is able to. Discussed oxygen vendors that could provide services in both locations, choice is [employer]. Referral sent via [employer], await response. Message from [address]:    \"Our office in [place] is going to process it  they can be reached at Phone: [phone], Fax: [phone] and of course were happy to help in whatever way we can locally. Once they give us the go-ahead well deliver his local equipment and then, once he goes home, theyll take over his care. \"    Patient ACCEPTED  By [address] for home oxygen therapy locally and in [place]. Await portable tank delivery.     Bal Burnett RN, MSN/Care manager  [phone]

## 2021-12-06 NOTE — DISCHARGE SUMMARY
Eleazar Pinzon [facility]  [address] [facility], [address] [place] [address]  [phone]    Physician Discharge Summary     Patient ID:  Bob Acosta  [id number]  49 y.o.  1970    Admit date: 11/28/2021    Discharge date and time: 12/6/2021 9:53 AM    Admission Diagnoses: Pneumonia due to COVID-19 virus [U07.1, J12.82]  COVID-19 virus infection [U07.1]  Acute respiratory failure with hypoxia ([address] [J96.01]    Discharge Diagnoses:  Principal Diagnosis Pneumonia due to COVID-19 virus                                            Principal Problem:    Pneumonia due to COVID-19 virus (11/28/2021)    Active Problems:    Acute respiratory failure with hypoxia ([address] (11/28/2021)      COVID-19 virus infection (11/28/2021)           Hospital Course:     48 yo hx of HTN, DM, CAD, presented w/ resp failure, COVID pneumonia    1) Acute hypoxic respiratory failure due to COVID 19: much improved. Was on hi-flow, now down to 2L. O2 sat was 85% on RA, improved to 94% with 2L. CM to set up home O2. Pulm was following     2) Pneumonia due to COVID-19: vaccinated. Improved with baricitinib, dexamethasone. Will complete a course of dexa    3) HTN/CAD/CM: s/p stents. Cont metoprolol, losartan, brillinta, ASA, statin. Stopped aldactone due to high potassium. Bumex also started     4) DM type 2 w/ hyperglycemia: A1C 11.7%. Patient was on NPH. Will cont home glipizide, metformin.   Patient will need to be re-assessed with his PCP     PCP: Miguel Talbot MD     Consults: Pulmonary/Intensive care    Significant Diagnostic Studies: hi-flow O2    Discharge Exam:  Physical Exam:    Gen: Well-developed, well-nourished, morbidly obese, in no acute distress  HEENT:  Pink conjunctivae, PERRL, hearing intact to voice, moist mucous membranes  Neck: Supple, without masses, thyroid non-tender  Resp: No accessory muscle use, scattered rhonchi   Card: No murmurs, normal S1, S2 without thrills, +edema  Abd:  Soft, non-tender, non-distended, normoactive bowel sounds are present  Lymph:  No cervical or inguinal adenopathy  Musc: No cyanosis or clubbing  Skin: No rashes   Neuro:  Cranial nerves are grossly intact, no focal motor weakness, follows commands appropriately  Psych:  Good insight, oriented to person, place and time, alert    Disposition: home  Discharge Condition: Stable    Patient Instructions:   Current Discharge Medication List      START taking these medications    Details   benzonatate (TESSALON) 100 mg capsule Take 1 Capsule by mouth three (3) times daily as needed for Cough. Qty: 30 Capsule, Refills: 0      guaiFENesin ER (MUCINEX) 600 mg ER tablet Take 1 Tablet by mouth every twelve (12) hours. Qty: 30 Tablet, Refills: 0      bumetanide (BUMEX) 1 mg tablet Take 1 Tablet by mouth daily. Qty: 30 Tablet, Refills: 0      dexAMETHasone (DECADRON) 6 mg tablet Take 1 Tablet by mouth Daily (before breakfast). Qty: 3 Tablet, Refills: 0      albuterol (PROVENTIL HFA, VENTOLIN HFA, PROAIR HFA) 90 mcg/actuation inhaler Take 2 Puffs by inhalation every six (6) hours as needed for Wheezing. Qty: 18 g, Refills: 1         CONTINUE these medications which have NOT CHANGED    Details   atorvastatin (LIPITOR) 80 mg tablet Take 80 mg by mouth daily. glipiZIDE (GLUCOTROL) 5 mg tablet Take 5 mg by mouth two (2) times a day. losartan (COZAAR) 25 mg tablet Take 25 mg by mouth daily. metFORMIN (GLUMETZA ER) 500 mg TG24 24 hour tablet Take 1,000 mg by mouth two (2) times daily (with meals). metoprolol tartrate (LOPRESSOR) 50 mg tablet Take 50 mg by mouth two (2) times a day. ticagrelor (Brilinta) 60 mg tab tablet Take 60 mg by mouth two (2) times a day. aspirin delayed-release 81 mg tablet Take 81 mg by mouth daily.          STOP taking these medications       spironolactone (ALDACTONE) 25 mg tablet Comments:   Reason for Stopping:             Activity: Activity as tolerated  Diet: Diabetic Diet  Wound Care: None needed    Follow-up with  Follow-up Information     Follow up With Specialties Details Why Contact Info    Gabi Anderson MD Internal Medicine Schedule an appointment as soon as possible for a visit in 5 days  [place] [address]  [phone]            Follow-up tests/labs none    Signed:  Era Smith MD  12/6/2021  9:53 AM  **I personally spent 45 min on discharge**

## 2021-12-06 NOTE — PROGRESS NOTES
Discharge instructions and prescription information reviewed with the patient and all questions answered. Patient to be discharged home via wheelchair upon arrival of his home oxygen.

## 2021-12-06 NOTE — PROGRESS NOTES
Patient refused blood sugar check and states he just wants to be discharged. Oxygen tank has arrived. He is calling his brother to transport him home.

## 2021-12-06 NOTE — PROGRESS NOTES
Name: [facility]: [address]   : 1970 Admit Date: 2021   Phone: [phone]  Room: [facility]/01   PCP: Claudine Pickard MD  MRN: [id number]   Date: 2021  Code: Full Code          Chart and notes reviewed. Data reviewed. I review the patient's current medications in the medical record at each encounter. I have evaluated and examined the patient. History of Present Illness:  Mr. Vishal Romero is a 53yo gentleman with a history of CAD s/p PIC, DM, and obesity who is admitted with COVID-19 and acute respiratory failure with hypoxia. He was admitted on  and had described symptoms that had begun about a week prior. He had dirrhea initially that had resolved, but had progressive shortness of breath on exertion that prompted admission. He has had a progressively increasing O2 requirement and is currently on HF at 25L. He is received baricitinib and steroids. He is frustrated that he was not initially treated with HF NC despite not needing this until more recently. Labs: WBC 7.9, HGb 10.9, , cr 0.78, CRP 4.97, d-dimer 2.15    Images reviewed:  CXR 2021: faint opacities on the right    Interval hx:  Afebrile   BP elevated  Sats 93% on 2L  D-dimer 1.09 - better  CRP 0.90 - better  Glucose elevated    ECHO: EF 55-60%      ROS: reports feeling much better than at admission and is ready to go home. Denies SOB or GAINES. Denies fever, chills, or cough. Denies CP.   Has no complaints      Social History     Tobacco Use    Smoking status: Not on file    Smokeless tobacco: Not on file   Substance Use Topics    Alcohol use: Not on file       No Known Allergies    Current Facility-Administered Medications   Medication Dose Route Frequency    guaiFENesin ER (MUCINEX) tablet 600 mg  600 mg Oral Q12H    benzonatate (TESSALON) capsule 100 mg  100 mg Oral TID PRN    albuterol-ipratropium (DUO-NEB) 2.5 MG-0.5 MG/3 ML  3 mL Nebulization Q4H PRN    insulin lispro (HUMALOG) injection 12 Units  12 Units SubCUTAneous TIDAC    bumetanide (BUMEX) injection 1 mg  1 mg IntraVENous DAILY    dexamethasone (PF) (DECADRON) 10 mg/mL injection 10 mg  10 mg IntraVENous Q12H    [Held by provider] losartan (COZAAR) tablet 50 mg  50 mg Oral DAILY    hydrALAZINE (APRESOLINE) 20 mg/mL injection 20 mg  20 mg IntraVENous Q6H PRN    insulin NPH (NOVOLIN N, HUMULIN N) injection 30 Units  30 Units SubCUTAneous ACB&D    insulin lispro (HUMALOG) injection   SubCUTAneous AC&HS    glucose chewable tablet 16 g  4 Tablet Oral PRN    dextrose (D50W) injection syrg 12.5-25 g  12.5-25 g IntraVENous PRN    glucagon (GLUCAGEN) injection 1 mg  1 mg IntraMUSCular PRN    aspirin delayed-release tablet 81 mg  81 mg Oral DAILY    atorvastatin (LIPITOR) tablet 80 mg  80 mg Oral DAILY    glipiZIDE (GLUCOTROL) tablet 5 mg  5 mg Oral BID    metoprolol tartrate (LOPRESSOR) tablet 50 mg  50 mg Oral BID    [Held by provider] spironolactone (ALDACTONE) tablet 25 mg  25 mg Oral DAILY    ticagrelor (BRILINTA) tablet 60 mg  60 mg Oral BID    baricitinib (OLUMIANT) tablet 4 mg  4 mg Oral DAILY    sodium chloride (NS) flush 5-40 mL  5-40 mL IntraVENous Q8H    sodium chloride (NS) flush 5-40 mL  5-40 mL IntraVENous PRN    acetaminophen (TYLENOL) tablet 650 mg  650 mg Oral Q6H PRN    Or    acetaminophen (TYLENOL) suppository 650 mg  650 mg Rectal Q6H PRN    polyethylene glycol (MIRALAX) packet 17 g  17 g Oral DAILY PRN    ondansetron (ZOFRAN ODT) tablet 4 mg  4 mg Oral Q8H PRN    Or    ondansetron (ZOFRAN) injection 4 mg  4 mg IntraVENous Q6H PRN    enoxaparin (LOVENOX) injection 40 mg  40 mg SubCUTAneous DAILY         REVIEW OF SYSTEMS   12 point ROS negative except as stated in the HPI.       Physical Exam:   Visit Vitals  /85 (BP 1 Location: Left arm, BP Patient Position: At rest)   Pulse 73   Temp 97.6 °F (36.4 °C)   Resp 22   Ht 6' 3\" (1.905 m)   Wt 117.9 kg (260 lb)   SpO2 93%   BMI 32.50 kg/m² General:  Alert, cooperative, no distress, appears stated age. Head:  Normocephalic, without obvious abnormality, atraumatic. Eyes:  Conjunctivae/corneas clear. Nose: Nares normal. Septum midline. Mucosa normal.    Throat: Lips, mucosa, and tongue normal.    Neck: Supple, symmetrical, trachea midline   Lungs:   Trace crackles in the bases, otherwise clear to auscultation bilaterally, resp nonlabored   Chest wall:  No tenderness or deformity. Heart:  Regular rate and rhythm, S1, S2 normal, no murmur, click, rub or gallop. Abdomen:   Soft, non-tender. Bowel sounds normal.    Extremities: Extremities normal, atraumatic, no cyanosis or edema. Pulses: 2+ and symmetric all extremities. Skin: Skin color, texture, turgor normal. No rashes or lesions   Neurologic: Grossly nonfocal       Lab Results   Component Value Date/Time    Sodium 131 (L) 12/06/2021 07:40 AM    Potassium 5.1 12/06/2021 07:40 AM    Chloride 98 12/06/2021 07:40 AM    CO2 27 12/06/2021 07:40 AM    BUN 24 (H) 12/06/2021 07:40 AM    Creatinine 0.65 (L) 12/06/2021 07:40 AM    Glucose 242 (H) 12/06/2021 07:40 AM    Calcium 8.6 12/06/2021 07:40 AM    Magnesium 2.0 12/06/2021 07:40 AM    Phosphorus 4.3 12/06/2021 07:40 AM       Lab Results   Component Value Date/Time    WBC 11.5 (H) 12/06/2021 07:40 AM    HGB 12.7 12/06/2021 07:40 AM    PLATELET 725 (H) 83/28/1706 07:40 AM    MCV 88.3 12/06/2021 07:40 AM       Lab Results   Component Value Date/Time    Alk.  phosphatase 50 12/06/2021 07:40 AM    Protein, total 6.2 (L) 12/06/2021 07:40 AM    Albumin 2.1 (L) 12/06/2021 07:40 AM    Globulin 4.1 (H) 12/06/2021 07:40 AM       Lab Results   Component Value Date/Time    Ferritin 864 (H) 12/04/2021 06:26 AM       Lab Results   Component Value Date/Time    C-Reactive protein 0.90 (H) 12/06/2021 07:40 AM        No results found for: PH, PHI, PCO2, PCO2I, PO2, PO2I, HCO3, HCO3I, FIO2, FIO2I    No results found for: CPK, RCK1, RCK2, RCK3, RCK4, CKNDX, CKND1, TROPT, TROIQ, BNPP, BNP     No results found for: CULT    No results found for: TOXA1, RPR, HBCM, HBSAG, HAAB, HCAB1, HAAT, G6PD, CRYAC, HIVGT, HIVR, HIV1, HIV12, HIVPC, HIVRPI    No results found for: VANCT, CPK    No results found for: COLOR, APPRN, SPGRU, KADE, PROTU, GLUCU, KETU, BILU, BLDU, UROU, ALMA ROSA, LEUKU, WBCU, RBCU, UEPI, BACTU, CASTS, UCRY    Images:  CXR (12/3/2021):  Increased bilateral peripheral airspace opacities    IMPRESSION  · COVID-19 PNA  · Acute respiratory failure with hypoxia    PLAN  · Goal sats 88% or higher, wean as tolerated; currently on 4L  · RT eval for home O2 needs  · Baricitinib  · Wean Dex IV to 6mg q 24hrs  · Continue Bumex 1mg IV daily  · Trend d-dimer and inflammatory markers  · Encourage prone positioning when able, IS use, OOB  · Lovenox      Saima Damon

## 2021-12-07 ENCOUNTER — PATIENT OUTREACH (OUTPATIENT)
Dept: CASE MANAGEMENT | Age: 51
End: 2021-12-07

## 2021-12-15 ENCOUNTER — PATIENT OUTREACH (OUTPATIENT)
Dept: CASE MANAGEMENT | Age: 51
End: 2021-12-15

## 2021-12-15 NOTE — PROGRESS NOTES
No response to calls or letter, episode closed.     Zia Aguilera MSN, RN, CCM / Care Transition Nurse / [phone]

## 2022-03-19 PROBLEM — U07.1 PNEUMONIA DUE TO COVID-19 VIRUS: Status: ACTIVE | Noted: 2021-11-28

## 2022-03-19 PROBLEM — J12.82 PNEUMONIA DUE TO COVID-19 VIRUS: Status: ACTIVE | Noted: 2021-11-28

## 2022-03-19 PROBLEM — J96.01 ACUTE RESPIRATORY FAILURE WITH HYPOXIA (HCC): Status: ACTIVE | Noted: 2021-11-28

## 2022-03-19 PROBLEM — U07.1 COVID-19 VIRUS INFECTION: Status: ACTIVE | Noted: 2021-11-28

## 2023-05-15 RX ORDER — DEXAMETHASONE 6 MG/1
6 TABLET ORAL
COMMUNITY
Start: 2021-12-06

## 2023-05-15 RX ORDER — ASPIRIN 81 MG/1
81 TABLET ORAL DAILY
COMMUNITY

## 2023-05-15 RX ORDER — GUAIFENESIN 600 MG/1
600 TABLET, EXTENDED RELEASE ORAL EVERY 12 HOURS
COMMUNITY
Start: 2021-12-06

## 2023-05-15 RX ORDER — BUMETANIDE 1 MG/1
1 TABLET ORAL DAILY
COMMUNITY
Start: 2021-12-06

## 2023-05-15 RX ORDER — METOPROLOL TARTRATE 50 MG/1
50 TABLET, FILM COATED ORAL 2 TIMES DAILY
COMMUNITY

## 2023-05-15 RX ORDER — GLIPIZIDE 5 MG/1
5 TABLET ORAL 2 TIMES DAILY
COMMUNITY

## 2023-05-15 RX ORDER — BENZONATATE 100 MG/1
100 CAPSULE ORAL 3 TIMES DAILY PRN
COMMUNITY
Start: 2021-12-06

## 2023-05-15 RX ORDER — METFORMIN HYDROCHLORIDE 500 MG/1
1000 TABLET, FILM COATED, EXTENDED RELEASE ORAL 2 TIMES DAILY WITH MEALS
COMMUNITY

## 2023-05-15 RX ORDER — ALBUTEROL SULFATE 90 UG/1
2 AEROSOL, METERED RESPIRATORY (INHALATION) EVERY 6 HOURS PRN
COMMUNITY
Start: 2021-12-06

## 2023-05-15 RX ORDER — LOSARTAN POTASSIUM 25 MG/1
25 TABLET ORAL DAILY
COMMUNITY

## 2023-05-15 RX ORDER — ATORVASTATIN CALCIUM 80 MG/1
80 TABLET, FILM COATED ORAL DAILY
COMMUNITY